# Patient Record
Sex: MALE | Race: BLACK OR AFRICAN AMERICAN
[De-identification: names, ages, dates, MRNs, and addresses within clinical notes are randomized per-mention and may not be internally consistent; named-entity substitution may affect disease eponyms.]

---

## 2017-08-10 ENCOUNTER — HOSPITAL ENCOUNTER (EMERGENCY)
Dept: HOSPITAL 23 - CED | Age: 57
LOS: 1 days | Discharge: HOME | End: 2017-08-11
Payer: COMMERCIAL

## 2017-08-10 DIAGNOSIS — E11.9: ICD-10-CM

## 2017-08-10 DIAGNOSIS — R53.1: Primary | ICD-10-CM

## 2017-08-10 DIAGNOSIS — Z79.899: ICD-10-CM

## 2017-08-10 DIAGNOSIS — Z79.2: ICD-10-CM

## 2017-08-10 DIAGNOSIS — I10: ICD-10-CM

## 2017-08-10 DIAGNOSIS — R53.83: ICD-10-CM

## 2017-08-10 LAB
BARBITURATES UR QL SCN: 0.6 MG/DL (ref 0.2–2)
BARBITURATES UR QL SCN: 4.1 G/DL (ref 3.5–5)
BASOPHIL#: 0 X10E3 (ref 0–0.3)
BASOPHIL%: 0.6 % (ref 0–2.5)
BENZODIAZ UR QL SCN: 23 U/L (ref 10–40)
BENZODIAZ UR QL SCN: 25 U/L (ref 10–42)
BLOOD UREA NITROGEN: 12 MG/DL (ref 9–23)
BUN/CREATININE RATIO: 12
BZE UR QL SCN: 56 U/L (ref 32–92)
CALCIUM SERUM: 9.6 MG/DL (ref 8.4–10.2)
CK MB SERPL-RTO: 13.7 % (ref 11–15.5)
CK MB SERPL-RTO: 31.1 G/DL (ref 30–36)
CREATININE SERUM: 1 MG/DL (ref 0.6–1.4)
DIFF IND: NO
EOSINOPHIL#: 0.1 X10E3 (ref 0–0.7)
EOSINOPHIL%: 2.2 % (ref 0–7)
GLOM FILT RATE ESTIMATED: 96.4 ML/MIN (ref 60–?)
GLUCOSE FASTING: 113 MG/DL (ref 70–110)
HEMATOCRIT: 35.3 % (ref 38–50)
HEMOGLOBIN: 11 GM/DL (ref 13–16)
KETONES UR QL: 100 MMOL/L (ref 100–111)
KETONES UR QL: 29 MMOL/L (ref 22–31)
LYMPHOCYTE#: 1.6 X10E3 (ref 1–3.5)
LYMPHOCYTE%: 33.7 % (ref 17–45)
MEAN CELL VOLUME: 79.7 FL (ref 83–96)
MEAN CORPUSCULAR HEMOGLOBIN: 24.8 PG (ref 28–34)
MEAN PLATELET VOLUME: 7.3 FL (ref 6.5–11.5)
METHADONE UR QL SCN: 1.8 NG/ML (ref 0–7.9)
MONOCYTE#: 0.3 X10E3 (ref 0–1)
MONOCYTE%: 6.5 % (ref 3–12)
NEUTROPHIL#: 2.7 X10E3 (ref 1.5–7.1)
NEUTROPHIL%: 57 % (ref 40–75)
PLATELET COUNT: 294 X10E3 (ref 140–420)
POC - TROPONIN: <0.05 NG/ML (ref ?–0.05)
POTASSIUM: 3.6 MMOL/L (ref 3.5–5.1)
PROTEIN TOTAL SERUM: 8.2 G/DL (ref 6–8.3)
RED BLOOD COUNT: 4.43 X10E (ref 3.9–5.6)
SODIUM: 138 MMOL/L (ref 135–145)
WHITE BLOOD COUNT: 4.7 X10E3 (ref 4–10.5)

## 2017-08-11 LAB
GENTAMICIN PEAK SERPL-MCNC: NO MG/L
METHADONE UR QL SCN: <1 NG/ML (ref 0–7.9)
POC - TROPONIN: <0.05 NG/ML (ref ?–0.05)
URINE APPEARANCE: CLEAR
URINE BILIRUBIN: (no result)
URINE BLOOD: (no result)
URINE COLOR: YELLOW
URINE GLUCOSE: (no result) MG/DL
URINE KETONE: (no result)
URINE LEUKOCYTE ESTERASE: (no result)
URINE NITRATE: (no result)
URINE PH: 6.5 (ref 5–8)
URINE PROTEIN: (no result)
URINE SOURCE: (no result)
URINE SPECIFIC GRAVITY: 1.02 (ref 1–1.03)
URINE UROBILINOGEN: 1 MG/DL

## 2022-06-02 ENCOUNTER — PRE-ADMISSION TESTING (OUTPATIENT)
Dept: PREADMISSION TESTING | Facility: HOSPITAL | Age: 62
End: 2022-06-02

## 2022-06-02 VITALS
WEIGHT: 299.4 LBS | RESPIRATION RATE: 18 BRPM | DIASTOLIC BLOOD PRESSURE: 80 MMHG | SYSTOLIC BLOOD PRESSURE: 130 MMHG | BODY MASS INDEX: 44.35 KG/M2 | TEMPERATURE: 98.3 F | HEIGHT: 69 IN | OXYGEN SATURATION: 99 % | HEART RATE: 70 BPM

## 2022-06-02 LAB
ANION GAP SERPL CALCULATED.3IONS-SCNC: 11.3 MMOL/L (ref 5–15)
BUN SERPL-MCNC: 9 MG/DL (ref 8–23)
BUN/CREAT SERPL: 11.1 (ref 7–25)
CALCIUM SPEC-SCNC: 9 MG/DL (ref 8.6–10.5)
CHLORIDE SERPL-SCNC: 106 MMOL/L (ref 98–107)
CO2 SERPL-SCNC: 24.7 MMOL/L (ref 22–29)
CREAT SERPL-MCNC: 0.81 MG/DL (ref 0.76–1.27)
DEPRECATED RDW RBC AUTO: 41.9 FL (ref 37–54)
EGFRCR SERPLBLD CKD-EPI 2021: 99.7 ML/MIN/1.73
ERYTHROCYTE [DISTWIDTH] IN BLOOD BY AUTOMATED COUNT: 14.4 % (ref 12.3–15.4)
GLUCOSE SERPL-MCNC: 155 MG/DL (ref 65–99)
HCT VFR BLD AUTO: 27.2 % (ref 37.5–51)
HGB BLD-MCNC: 8.1 G/DL (ref 13–17.7)
MCH RBC QN AUTO: 24.5 PG (ref 26.6–33)
MCHC RBC AUTO-ENTMCNC: 29.8 G/DL (ref 31.5–35.7)
MCV RBC AUTO: 82.4 FL (ref 79–97)
PLATELET # BLD AUTO: 307 10*3/MM3 (ref 140–450)
PMV BLD AUTO: 9.2 FL (ref 6–12)
POTASSIUM SERPL-SCNC: 3.9 MMOL/L (ref 3.5–5.2)
QT INTERVAL: 369 MS
RBC # BLD AUTO: 3.3 10*6/MM3 (ref 4.14–5.8)
SODIUM SERPL-SCNC: 142 MMOL/L (ref 136–145)
WBC NRBC COR # BLD: 4.06 10*3/MM3 (ref 3.4–10.8)

## 2022-06-02 PROCEDURE — 80048 BASIC METABOLIC PNL TOTAL CA: CPT

## 2022-06-02 PROCEDURE — 85027 COMPLETE CBC AUTOMATED: CPT

## 2022-06-02 PROCEDURE — 36415 COLL VENOUS BLD VENIPUNCTURE: CPT

## 2022-06-02 PROCEDURE — 93005 ELECTROCARDIOGRAM TRACING: CPT

## 2022-06-02 PROCEDURE — 93010 ELECTROCARDIOGRAM REPORT: CPT | Performed by: INTERNAL MEDICINE

## 2022-06-02 RX ORDER — MULTIPLE VITAMINS W/ MINERALS TAB 9MG-400MCG
1 TAB ORAL DAILY
COMMUNITY
End: 2022-06-07 | Stop reason: HOSPADM

## 2022-06-02 RX ORDER — FERROUS SULFATE 324(65)MG
1 TABLET, DELAYED RELEASE (ENTERIC COATED) ORAL DAILY
COMMUNITY

## 2022-06-02 RX ORDER — SEMAGLUTIDE 1.34 MG/ML
2 INJECTION, SOLUTION SUBCUTANEOUS WEEKLY
COMMUNITY
Start: 2022-05-23

## 2022-06-02 RX ORDER — FAMOTIDINE 20 MG/1
20 TABLET, FILM COATED ORAL 2 TIMES DAILY PRN
COMMUNITY
Start: 2022-04-22

## 2022-06-02 RX ORDER — ASPIRIN 81 MG/1
81 TABLET ORAL DAILY
COMMUNITY
End: 2022-06-07 | Stop reason: HOSPADM

## 2022-06-02 RX ORDER — HYDROCHLOROTHIAZIDE 25 MG/1
25 TABLET ORAL DAILY
COMMUNITY
Start: 2022-03-10

## 2022-06-02 RX ORDER — MELATONIN
1000 DAILY
COMMUNITY
End: 2022-06-07 | Stop reason: HOSPADM

## 2022-06-02 RX ORDER — MELOXICAM 15 MG/1
15 TABLET ORAL DAILY
COMMUNITY
End: 2022-06-07 | Stop reason: HOSPADM

## 2022-06-02 RX ORDER — LISINOPRIL 30 MG/1
30 TABLET ORAL DAILY
COMMUNITY
Start: 2022-02-25

## 2022-06-02 NOTE — DISCHARGE INSTRUCTIONS
Take the following medications the morning of surgery:  NONE    Arrive to hospital on your day of surgery at  8:00 AM.     If you are on prescription narcotic pain medication to control your pain you may also take that medication the morning of surgery.    General Instructions:  Do not eat solid food after midnight the night before surgery.  You may drink clear liquids day of surgery but must stop at least one hour before your hospital arrival time.  It is beneficial for you to have a clear drink that contains carbohydrates the day of surgery.  We suggest a 12 to 20 ounce bottle of Gatorade or Powerade for non-diabetic patients or a 12 to 20 ounce bottle of G2 or Powerade Zero for diabetic patients. (Pediatric patients, are not advised to drink a 12 to 20 ounce carbohydrate drink)    Clear liquids are liquids you can see through.  Nothing red in color.     Plain water                               Sports drinks  Sodas                                   Gelatin (Jell-O)  Fruit juices without pulp such as white grape juice and apple juice  Popsicles that contain no fruit or yogurt  Tea or coffee (no cream or milk added)  Gatorade / Powerade  G2 / Powerade Zero    Infants may have breast milk up to four hours before surgery.  Infants drinking formula may drink formula up to six hours before surgery.   Patients who avoid smoking, chewing tobacco and alcohol for 4 weeks prior to surgery have a reduced risk of post-operative complications.  Quit smoking as many days before surgery as you can.  Do not smoke, use chewing tobacco or drink alcohol the day of surgery.   If applicable bring your C-PAP/ BI-PAP machine.  Bring any papers given to you in the doctor’s office.  Wear clean comfortable clothes.  Do not wear contact lenses, false eyelashes or make-up.  Bring a case for your glasses.   Bring crutches or walker if applicable.  Remove all piercings.  Leave jewelry and any other valuables at home.  Hair extensions with  metal clips must be removed prior to surgery.  The Pre-Admission Testing nurse will instruct you to bring medications if unable to obtain an accurate list in Pre-Admission Testing.        If you were given a blood bank ID arm band remember to bring it with you the day of surgery.    Preventing a Surgical Site Infection:  For 2 to 3 days before surgery, avoid shaving with a razor because the razor can irritate skin and make it easier to develop an infection.    Any areas of open skin can increase the risk of a post-operative wound infection by allowing bacteria to enter and travel throughout the body.  Notify your surgeon if you have any skin wounds / rashes even if it is not near the expected surgical site.  The area will need assessed to determine if surgery should be delayed until it is healed.  The night prior to surgery shower using a fresh bar of anti-bacterial soap (such as Dial) and clean washcloth.  Sleep in a clean bed with clean clothing.  Do not allow pets to sleep with you.  Shower on the morning of surgery using a fresh bar of anti-bacterial soap (such as Dial) and clean washcloth.  Dry with a clean towel and dress in clean clothing.  Ask your surgeon if you will be receiving antibiotics prior to surgery.  Make sure you, your family, and all healthcare providers clean their hands with soap and water or an alcohol based hand  before caring for you or your wound.    Day of surgery:  Your arrival time is approximately two hours before your scheduled surgery time.  Upon arrival, a Pre-op nurse and Anesthesiologist will review your health history, obtain vital signs, and answer questions you may have.  The only belongings needed at this time will be a list of your home medications and if applicable your C-PAP/BI-PAP machine.  A Pre-op nurse will start an IV and you may receive medication in preparation for surgery, including something to help you relax.     Please be aware that surgery does come  with discomfort.  We want to make every effort to control your discomfort so please discuss any uncontrolled symptoms with your nurse.   Your doctor will most likely have prescribed pain medications.      If you are going home after surgery you will receive individualized written care instructions before being discharged.  A responsible adult must drive you to and from the hospital on the day of your surgery and stay with you for 24 hours.  Discharge prescriptions can be filled by the hospital pharmacy during regular pharmacy hours.  If you are having surgery late in the day/evening your prescription may be e-prescribed to your pharmacy.  Please verify your pharmacy hours or chose a 24 hour pharmacy to avoid not having access to your prescription because your pharmacy has closed for the day.    If you are staying overnight following surgery, you will be transported to your hospital room following the recovery period.  [facility] has all private rooms.    If you have any questions please call Pre-Admission Testing at [phone].  Deductibles and co-payments are collected on the day of service. Please be prepared to pay the required co-pay, deductible or deposit on the day of service as defined by your plan.    Patient Education for Self-Quarantine Process    Following your COVID testing, we strongly recommend that you wear a mask when you are with other people and practice social distancing.   Limit your activities to only required outings.  Wash your hands with soap and water frequently for at least 20 seconds.   Avoid touching your eyes, nose and mouth with unwashed hands.  Do not share anything - utensils, drinking glasses, food from the same bowl.   Sanitize household surfaces daily. Include all high touch areas (door handles, light switches, phones, countertops, etc.)    Call your surgeon immediately if you experience any of the following symptoms:  Sore Throat  Shortness of Breath or  difficulty breathing  Cough  Chills  Body soreness or muscle pain  Headache  Fever  New loss of taste or smell  Do not arrive for your surgery ill.  Your procedure will need to be rescheduled to another time.  You will need to call your physician before the day of surgery to avoid any unnecessary exposure to hospital staff as well as other patients.

## 2022-06-04 ENCOUNTER — LAB (OUTPATIENT)
Dept: LAB | Facility: HOSPITAL | Age: 62
End: 2022-06-04

## 2022-06-04 LAB — SARS-COV-2 ORF1AB RESP QL NAA+PROBE: NOT DETECTED

## 2022-06-04 PROCEDURE — U0004 COV-19 TEST NON-CDC HGH THRU: HCPCS

## 2022-06-04 PROCEDURE — C9803 HOPD COVID-19 SPEC COLLECT: HCPCS

## 2022-06-07 ENCOUNTER — ANESTHESIA EVENT (OUTPATIENT)
Dept: PERIOP | Facility: HOSPITAL | Age: 62
End: 2022-06-07

## 2022-06-07 ENCOUNTER — ANESTHESIA (OUTPATIENT)
Dept: PERIOP | Facility: HOSPITAL | Age: 62
End: 2022-06-07

## 2022-06-07 ENCOUNTER — HOSPITAL ENCOUNTER (OUTPATIENT)
Facility: HOSPITAL | Age: 62
Setting detail: HOSPITAL OUTPATIENT SURGERY
Discharge: HOME | End: 2022-06-07
Attending: OPHTHALMOLOGY | Admitting: OPHTHALMOLOGY

## 2022-06-07 VITALS
TEMPERATURE: 98.1 F | RESPIRATION RATE: 18 BRPM | DIASTOLIC BLOOD PRESSURE: 88 MMHG | OXYGEN SATURATION: 99 % | HEART RATE: 71 BPM | SYSTOLIC BLOOD PRESSURE: 166 MMHG

## 2022-06-07 LAB — GLUCOSE BLDC GLUCOMTR-MCNC: 144 MG/DL (ref 70–130)

## 2022-06-07 PROCEDURE — 25010000002 DEXAMETHASONE PER 1 MG: Performed by: NURSE ANESTHETIST, CERTIFIED REGISTERED

## 2022-06-07 PROCEDURE — 25010000002 FENTANYL CITRATE (PF) 50 MCG/ML SOLUTION: Performed by: NURSE ANESTHETIST, CERTIFIED REGISTERED

## 2022-06-07 PROCEDURE — 25010000002 PROPOFOL 10 MG/ML EMULSION: Performed by: NURSE ANESTHETIST, CERTIFIED REGISTERED

## 2022-06-07 PROCEDURE — 82962 GLUCOSE BLOOD TEST: CPT

## 2022-06-07 PROCEDURE — 25010000002 ONDANSETRON PER 1 MG: Performed by: NURSE ANESTHETIST, CERTIFIED REGISTERED

## 2022-06-07 RX ORDER — EPHEDRINE SULFATE 50 MG/ML
5 INJECTION, SOLUTION INTRAVENOUS ONCE AS NEEDED
Status: DISCONTINUED | OUTPATIENT
Start: 2022-06-07 | End: 2022-06-07 | Stop reason: HOSPADM

## 2022-06-07 RX ORDER — HYDROMORPHONE HYDROCHLORIDE 1 MG/ML
0.5 INJECTION, SOLUTION INTRAMUSCULAR; INTRAVENOUS; SUBCUTANEOUS
Status: DISCONTINUED | OUTPATIENT
Start: 2022-06-07 | End: 2022-06-07 | Stop reason: HOSPADM

## 2022-06-07 RX ORDER — FLUMAZENIL 0.1 MG/ML
0.2 INJECTION INTRAVENOUS AS NEEDED
Status: DISCONTINUED | OUTPATIENT
Start: 2022-06-07 | End: 2022-06-07 | Stop reason: HOSPADM

## 2022-06-07 RX ORDER — HYDROCODONE BITARTRATE AND ACETAMINOPHEN 5; 325 MG/1; MG/1
1 TABLET ORAL EVERY 4 HOURS PRN
Qty: 15 TABLET | Refills: 0 | Status: SHIPPED | OUTPATIENT
Start: 2022-06-07 | End: 2023-04-03

## 2022-06-07 RX ORDER — ERYTHROMYCIN 5 MG/G
OINTMENT OPHTHALMIC 2 TIMES DAILY
Qty: 3.5 G | Refills: 0 | Status: SHIPPED | OUTPATIENT
Start: 2022-06-07 | End: 2023-04-03

## 2022-06-07 RX ORDER — SODIUM CHLORIDE, SODIUM LACTATE, POTASSIUM CHLORIDE, CALCIUM CHLORIDE 600; 310; 30; 20 MG/100ML; MG/100ML; MG/100ML; MG/100ML
9 INJECTION, SOLUTION INTRAVENOUS CONTINUOUS PRN
Status: DISCONTINUED | OUTPATIENT
Start: 2022-06-07 | End: 2022-06-07 | Stop reason: HOSPADM

## 2022-06-07 RX ORDER — MAGNESIUM HYDROXIDE 1200 MG/15ML
LIQUID ORAL AS NEEDED
Status: DISCONTINUED | OUTPATIENT
Start: 2022-06-07 | End: 2022-06-07 | Stop reason: HOSPADM

## 2022-06-07 RX ORDER — SODIUM CHLORIDE 0.9 % (FLUSH) 0.9 %
10 SYRINGE (ML) INJECTION AS NEEDED
Status: DISCONTINUED | OUTPATIENT
Start: 2022-06-07 | End: 2022-06-07 | Stop reason: HOSPADM

## 2022-06-07 RX ORDER — ONDANSETRON 2 MG/ML
INJECTION INTRAMUSCULAR; INTRAVENOUS AS NEEDED
Status: DISCONTINUED | OUTPATIENT
Start: 2022-06-07 | End: 2022-06-07 | Stop reason: SURG

## 2022-06-07 RX ORDER — FENTANYL CITRATE 50 UG/ML
50 INJECTION, SOLUTION INTRAMUSCULAR; INTRAVENOUS
Status: DISCONTINUED | OUTPATIENT
Start: 2022-06-07 | End: 2022-06-07 | Stop reason: HOSPADM

## 2022-06-07 RX ORDER — SODIUM CHLORIDE 0.9 % (FLUSH) 0.9 %
10 SYRINGE (ML) INJECTION EVERY 12 HOURS SCHEDULED
Status: DISCONTINUED | OUTPATIENT
Start: 2022-06-07 | End: 2022-06-07 | Stop reason: HOSPADM

## 2022-06-07 RX ORDER — DEXAMETHASONE SODIUM PHOSPHATE 4 MG/ML
INJECTION, SOLUTION INTRA-ARTICULAR; INTRALESIONAL; INTRAMUSCULAR; INTRAVENOUS; SOFT TISSUE AS NEEDED
Status: DISCONTINUED | OUTPATIENT
Start: 2022-06-07 | End: 2022-06-07 | Stop reason: SURG

## 2022-06-07 RX ORDER — HYDROCODONE BITARTRATE AND ACETAMINOPHEN 7.5; 325 MG/1; MG/1
1 TABLET ORAL EVERY 4 HOURS PRN
Status: DISCONTINUED | OUTPATIENT
Start: 2022-06-07 | End: 2022-06-07 | Stop reason: HOSPADM

## 2022-06-07 RX ORDER — LIDOCAINE HYDROCHLORIDE 10 MG/ML
0.5 INJECTION, SOLUTION EPIDURAL; INFILTRATION; INTRACAUDAL; PERINEURAL ONCE AS NEEDED
Status: DISCONTINUED | OUTPATIENT
Start: 2022-06-07 | End: 2022-06-07 | Stop reason: HOSPADM

## 2022-06-07 RX ORDER — ONDANSETRON 2 MG/ML
4 INJECTION INTRAMUSCULAR; INTRAVENOUS ONCE AS NEEDED
Status: DISCONTINUED | OUTPATIENT
Start: 2022-06-07 | End: 2022-06-07 | Stop reason: HOSPADM

## 2022-06-07 RX ORDER — MIDAZOLAM HYDROCHLORIDE 1 MG/ML
1 INJECTION INTRAMUSCULAR; INTRAVENOUS
Status: DISCONTINUED | OUTPATIENT
Start: 2022-06-07 | End: 2022-06-07 | Stop reason: HOSPADM

## 2022-06-07 RX ORDER — FENTANYL CITRATE 50 UG/ML
INJECTION, SOLUTION INTRAMUSCULAR; INTRAVENOUS AS NEEDED
Status: DISCONTINUED | OUTPATIENT
Start: 2022-06-07 | End: 2022-06-07 | Stop reason: SURG

## 2022-06-07 RX ORDER — HYDROCODONE BITARTRATE AND ACETAMINOPHEN 5; 325 MG/1; MG/1
1 TABLET ORAL ONCE AS NEEDED
Status: DISCONTINUED | OUTPATIENT
Start: 2022-06-07 | End: 2022-06-07 | Stop reason: HOSPADM

## 2022-06-07 RX ORDER — ERYTHROMYCIN 5 MG/G
OINTMENT OPHTHALMIC AS NEEDED
Status: DISCONTINUED | OUTPATIENT
Start: 2022-06-07 | End: 2022-06-07 | Stop reason: HOSPADM

## 2022-06-07 RX ORDER — PROPOFOL 10 MG/ML
VIAL (ML) INTRAVENOUS AS NEEDED
Status: DISCONTINUED | OUTPATIENT
Start: 2022-06-07 | End: 2022-06-07 | Stop reason: SURG

## 2022-06-07 RX ORDER — LIDOCAINE HYDROCHLORIDE 20 MG/ML
INJECTION, SOLUTION INFILTRATION; PERINEURAL AS NEEDED
Status: DISCONTINUED | OUTPATIENT
Start: 2022-06-07 | End: 2022-06-07 | Stop reason: SURG

## 2022-06-07 RX ORDER — TETRACAINE HYDROCHLORIDE 5 MG/ML
SOLUTION OPHTHALMIC AS NEEDED
Status: DISCONTINUED | OUTPATIENT
Start: 2022-06-07 | End: 2022-06-07 | Stop reason: HOSPADM

## 2022-06-07 RX ADMIN — LIDOCAINE HYDROCHLORIDE 80 MG: 20 INJECTION, SOLUTION INFILTRATION; PERINEURAL at 10:46

## 2022-06-07 RX ADMIN — DEXAMETHASONE SODIUM PHOSPHATE 8 MG: 4 INJECTION, SOLUTION INTRAMUSCULAR; INTRAVENOUS at 10:51

## 2022-06-07 RX ADMIN — FENTANYL CITRATE 50 MCG: 50 INJECTION INTRAMUSCULAR; INTRAVENOUS at 10:51

## 2022-06-07 RX ADMIN — PROPOFOL 50 MG: 10 INJECTION, EMULSION INTRAVENOUS at 10:46

## 2022-06-07 RX ADMIN — HYDROCODONE BITARTRATE AND ACETAMINOPHEN 1 TABLET: 7.5; 325 TABLET ORAL at 12:41

## 2022-06-07 RX ADMIN — ONDANSETRON 4 MG: 2 INJECTION INTRAMUSCULAR; INTRAVENOUS at 10:51

## 2022-06-07 RX ADMIN — SODIUM CHLORIDE, POTASSIUM CHLORIDE, SODIUM LACTATE AND CALCIUM CHLORIDE 9 ML/HR: 600; 310; 30; 20 INJECTION, SOLUTION INTRAVENOUS at 08:57

## 2022-06-07 RX ADMIN — PROPOFOL 50 MG: 10 INJECTION, EMULSION INTRAVENOUS at 10:45

## 2022-06-07 RX ADMIN — Medication 10 ML: at 08:57

## 2022-06-07 RX ADMIN — FENTANYL CITRATE 50 MCG: 50 INJECTION INTRAMUSCULAR; INTRAVENOUS at 10:46

## 2022-06-07 NOTE — OP NOTE
OPERATIVE NOTE    Patient Identification:  Name: Sebastian Orlando  Age: 61 y.o.  Sex: male  :  1960  MRN: [id number]                                               Preoperative diagnosis: Right upper eyelid ptosis  Postoperative diagnosis: same  Procedure: Right upper eyelid ptosis repair  Surgeon: Cristian Alvarenga MD who was present and scrubbed throughout all critical portions of the operation  Assistants: Armin Hernández MD  Anesthesia: MAC  EBL: less than 50cc  Specimens:  * No orders in the log *    Description of the procedure: The patient was taken to the operating room and placed on the table in the supine position, where anesthesia was induced. 2% lidocaine with epinephrine and 0.5% marcaine in a 1:1 fashion was injected over the surgical site, and the patient was prepped and draped in the usual manner for orbitofacial surgery.     Corneal protectors were placed in both eyes.     A 15 Bard-Kvng blade incision was made through the right upper eyelid crease 8 mm from the eyelash margin, across the entire horizontal extent of the right upper eyelid. A second incision was made 10mm inferior to the junction of the brow and eyelid, and a pinch test was used to ensure the amount of skin excision was appropriate. The intervening tissue was excised with a 15 Bard-Kvng blade and Vicente scissors. The orbital septum was opened horizontally, and excess fatty tissue was excised. The levator aponeurosis was identified at its attachment to the tarsal plate and was severed from the tarsus with Vicente scissors. The cut inferior edge of the levator aponeurosis was advanced to a lower point the tarsal plate and was reattached with multiple interrupted 6-0 silk sutures, partially penetrating the tarsus to avoid corneal irritation. The sutures were adjusted until the eyelid height and contour were judged to be satisfactory. The skin was then closed with 5-0 fast absorbing suture in an interrupted and running  fashion.     The corneal protectors were removed and antibiotic ophthalmic ointment was placed over the surgical site.     The patient was then awakened and taken from the operating room in good condition, having tolerated the procedure well. There were no complications, and the estimated blood loss was less than 50 cc.

## 2022-06-07 NOTE — ANESTHESIA POSTPROCEDURE EVALUATION
Patient: Sebastian Orlando    Procedure Summary     Date: 06/07/22 Room / Location:  [facility]  [facility]    Anesthesia Start: 1040 Anesthesia Stop: 1151    Procedure: RIGHT UPPER LID PTOSIS REPAIR (Right Eye) Diagnosis:     Surgeons: Cristian Alvarenga MD Provider: Trey Spears MD    Anesthesia Type: MAC ASA Status: 3          Anesthesia Type: MAC    Vitals  Vitals Value Taken Time   /88 06/07/22 1235   Temp     Pulse 71 06/07/22 1235   Resp 18 06/07/22 1235   SpO2 99 % 06/07/22 1235           Post Anesthesia Care and Evaluation    Patient location during evaluation: bedside  Patient participation: complete - patient participated  Level of consciousness: awake  Pain management: adequate    Airway patency: patent  Anesthetic complications: No anesthetic complications  PONV Status: controlled  Cardiovascular status: acceptable  Respiratory status: acceptable  Hydration status: acceptable    Comments: --------------------            06/07/22               1235     --------------------   BP:       166/88     Pulse:      71       Resp:       18       Temp:                SpO2:      99%      --------------------

## 2022-06-07 NOTE — ANESTHESIA PREPROCEDURE EVALUATION
Anesthesia Evaluation     Patient summary reviewed and Nursing notes reviewed   NPO Solid Status: > 8 hours             Airway   Mallampati: II  TM distance: >3 FB  Neck ROM: full  no difficulty expected  Dental - normal exam     Pulmonary - negative pulmonary ROS and normal exam   Cardiovascular - normal exam    (+) hypertension,       Neuro/Psych- negative ROS  GI/Hepatic/Renal/Endo    (+) morbid obesity,  diabetes mellitus,     Musculoskeletal (-) negative ROS    Abdominal  - normal exam   Substance History - negative use     OB/GYN negative ob/gyn ROS         Other        ROS/Med Hx Other: Anemia  H/h - 8/27                  Anesthesia Plan    ASA 3     MAC     (---------------------------               06/07/22                      0820         ---------------------------   BP:          147/80         Pulse:         67           Resp:          18           Temp:   36.7 °C (98.1 °F)   SpO2:          99%         ---------------------------)  intravenous induction     Anesthetic plan, risks, benefits, and alternatives have been provided, discussed and informed consent has been obtained with: patient.    Plan discussed with CRNA.        CODE STATUS:

## 2022-06-07 NOTE — H&P
History & Physical       Patient: Sebastian Orlando    Date of Admission: 2022  7:43 AM    YOB: 1960    Medical Record Number: [id number]      Chief Complaints: Right upper eyelid ptosis      History of Present Illness: 61 y.o. male presents with right upper eyelid ptosis. No new meds/health problems since office visit      Allergies: No Known Allergies    10 point review of systems negative, except pertaining to the HPI    Medications:   Home Medications:  No current facility-administered medications on file prior to encounter.     No current outpatient medications on file prior to encounter.     Current Medications:  Scheduled Meds:sodium chloride, 10 mL, Intravenous, Q12H      Continuous Infusions:lactated ringers, 9 mL/hr, Last Rate: 9 mL/hr (22 0857)      PRN Meds:.•  lactated ringers  •  lidocaine PF 1%  •  midazolam  •  sodium chloride    Past Medical History:   Diagnosis Date   • Anemia     TAKING IRON   • Arthritis     HIPS   • Cataract    • Diabetes mellitus (HCC)    • GERD (gastroesophageal reflux disease)    • History of migraine     SEVERAL YEARS AGO   • Hypertension    • Ptosis of eyelid     RIGHT        Past Surgical History:   Procedure Laterality Date   • COLONOSCOPY     • TESTICLE UNDESCENDED REPAIR     • TOTAL HIP ARTHROPLASTY Left         Social History     Occupational History   • Not on file   Tobacco Use   • Smoking status: Former Smoker     Packs/day: 0.50     Years: 20.00     Pack years: 10.00     Types: Cigarettes     Quit date:      Years since quittin.4   • Smokeless tobacco: Never Used   Vaping Use   • Vaping Use: Never used   Substance and Sexual Activity   • Alcohol use: Yes     Comment: SOCIALLY   • Drug use: Never   • Sexual activity: Defer      Social History     Social History Narrative   • Not on file        Family History   Problem Relation Age of Onset   • Malig Hyperthermia Neg Hx            Physical Exam   Constitutional: Alert, cooperative,  in no acute distress    Head: Normocephalic.   Eyes:    Right upper eyelid ptosis  Neck: Normal range of motion.   Cardiovascular: Normal rate.    Pulmonary/Chest: Effort normal.   Neurological: Alert.   Skin: Skin is warm.   Psychiatric: Normal mood and affect.       Assessment/Plan:  The patient voiced understanding of the risks, benefits, and alternative forms of treatment that were discussed and the patient consents to proceed with right upper eyelid ptosis repair    Cristian Alvarenga MD

## 2023-04-03 ENCOUNTER — HOSPITAL ENCOUNTER (OUTPATIENT)
Dept: GENERAL RADIOLOGY | Facility: HOSPITAL | Age: 63
Discharge: HOME | End: 2023-04-03
Payer: COMMERCIAL

## 2023-04-03 ENCOUNTER — PRE-ADMISSION TESTING (OUTPATIENT)
Dept: PREADMISSION TESTING | Facility: HOSPITAL | Age: 63
End: 2023-04-03
Payer: COMMERCIAL

## 2023-04-03 VITALS
RESPIRATION RATE: 18 BRPM | DIASTOLIC BLOOD PRESSURE: 83 MMHG | OXYGEN SATURATION: 97 % | HEART RATE: 86 BPM | WEIGHT: 307 LBS | BODY MASS INDEX: 35.52 KG/M2 | TEMPERATURE: 98.2 F | SYSTOLIC BLOOD PRESSURE: 154 MMHG | HEIGHT: 78 IN

## 2023-04-03 LAB
ABO GROUP BLD: NORMAL
ALBUMIN SERPL-MCNC: 4.3 G/DL (ref 3.5–5.2)
ALBUMIN/GLOB SERPL: 1.3 G/DL
ALP SERPL-CCNC: 73 U/L (ref 39–117)
ALT SERPL W P-5'-P-CCNC: 18 U/L (ref 1–41)
ANION GAP SERPL CALCULATED.3IONS-SCNC: 9.8 MMOL/L (ref 5–15)
AST SERPL-CCNC: 18 U/L (ref 1–40)
BACTERIA UR QL AUTO: NORMAL /HPF
BILIRUB SERPL-MCNC: 0.3 MG/DL (ref 0–1.2)
BILIRUB UR QL STRIP: NEGATIVE
BLD GP AB SCN SERPL QL: NEGATIVE
BUN SERPL-MCNC: 9 MG/DL (ref 8–23)
BUN/CREAT SERPL: 11.7 (ref 7–25)
CALCIUM SPEC-SCNC: 9.9 MG/DL (ref 8.6–10.5)
CHLORIDE SERPL-SCNC: 101 MMOL/L (ref 98–107)
CLARITY UR: CLEAR
CO2 SERPL-SCNC: 27.2 MMOL/L (ref 22–29)
COLOR UR: YELLOW
CREAT SERPL-MCNC: 0.77 MG/DL (ref 0.76–1.27)
DEPRECATED RDW RBC AUTO: 36.8 FL (ref 37–54)
EGFRCR SERPLBLD CKD-EPI 2021: 100.6 ML/MIN/1.73
ERYTHROCYTE [DISTWIDTH] IN BLOOD BY AUTOMATED COUNT: 12.8 % (ref 12.3–15.4)
GLOBULIN UR ELPH-MCNC: 3.4 GM/DL
GLUCOSE SERPL-MCNC: 197 MG/DL (ref 65–99)
GLUCOSE UR STRIP-MCNC: NEGATIVE MG/DL
HCT VFR BLD AUTO: 35.3 % (ref 37.5–51)
HGB BLD-MCNC: 11.1 G/DL (ref 13–17.7)
HGB UR QL STRIP.AUTO: NEGATIVE
HYALINE CASTS UR QL AUTO: NORMAL /LPF
INR PPP: 0.91 (ref 0.9–1.1)
KETONES UR QL STRIP: NEGATIVE
LEUKOCYTE ESTERASE UR QL STRIP.AUTO: NEGATIVE
MCH RBC QN AUTO: 24.7 PG (ref 26.6–33)
MCHC RBC AUTO-ENTMCNC: 31.4 G/DL (ref 31.5–35.7)
MCV RBC AUTO: 78.6 FL (ref 79–97)
NITRITE UR QL STRIP: NEGATIVE
PH UR STRIP.AUTO: 5.5 [PH] (ref 5–8)
PLATELET # BLD AUTO: 311 10*3/MM3 (ref 140–450)
PMV BLD AUTO: 9.4 FL (ref 6–12)
POTASSIUM SERPL-SCNC: 4 MMOL/L (ref 3.5–5.2)
PROT SERPL-MCNC: 7.7 G/DL (ref 6–8.5)
PROT UR QL STRIP: NEGATIVE
PROTHROMBIN TIME: 12.4 SECONDS (ref 11.7–14.2)
RBC # BLD AUTO: 4.49 10*6/MM3 (ref 4.14–5.8)
RBC # UR STRIP: NORMAL /HPF
REF LAB TEST METHOD: NORMAL
RH BLD: POSITIVE
SODIUM SERPL-SCNC: 138 MMOL/L (ref 136–145)
SP GR UR STRIP: 1.01 (ref 1–1.03)
SQUAMOUS #/AREA URNS HPF: NORMAL /HPF
T&S EXPIRATION DATE: NORMAL
UROBILINOGEN UR QL STRIP: NORMAL
WBC # UR STRIP: NORMAL /HPF
WBC NRBC COR # BLD: 3.97 10*3/MM3 (ref 3.4–10.8)

## 2023-04-03 PROCEDURE — 86850 RBC ANTIBODY SCREEN: CPT

## 2023-04-03 PROCEDURE — 85027 COMPLETE CBC AUTOMATED: CPT

## 2023-04-03 PROCEDURE — 71046 X-RAY EXAM CHEST 2 VIEWS: CPT

## 2023-04-03 PROCEDURE — 81001 URINALYSIS AUTO W/SCOPE: CPT

## 2023-04-03 PROCEDURE — 36415 COLL VENOUS BLD VENIPUNCTURE: CPT

## 2023-04-03 PROCEDURE — 86900 BLOOD TYPING SEROLOGIC ABO: CPT

## 2023-04-03 PROCEDURE — 73502 X-RAY EXAM HIP UNI 2-3 VIEWS: CPT

## 2023-04-03 PROCEDURE — 85610 PROTHROMBIN TIME: CPT

## 2023-04-03 PROCEDURE — 86901 BLOOD TYPING SEROLOGIC RH(D): CPT

## 2023-04-03 PROCEDURE — 93010 ELECTROCARDIOGRAM REPORT: CPT | Performed by: INTERNAL MEDICINE

## 2023-04-03 PROCEDURE — 80053 COMPREHEN METABOLIC PANEL: CPT

## 2023-04-03 PROCEDURE — 93005 ELECTROCARDIOGRAM TRACING: CPT

## 2023-04-03 RX ORDER — CHLORHEXIDINE GLUCONATE 500 MG/1
CLOTH TOPICAL
COMMUNITY
End: 2023-04-14 | Stop reason: HOSPADM

## 2023-04-03 RX ORDER — MELOXICAM 15 MG/1
15 TABLET ORAL DAILY
COMMUNITY
Start: 2023-02-07 | End: 2023-04-14 | Stop reason: HOSPADM

## 2023-04-03 NOTE — DISCHARGE INSTRUCTIONS
ARRIVE DAY OF SURGERY AS INFORMED BY DR VERGARA'S OFFICE        Take the following medications the morning of surgery:  NONE      If you are on prescription narcotic pain medication to control your pain you may also take that medication the morning of surgery.    General Instructions:  Do not eat solid food after midnight the night before surgery.  You may drink clear liquids day of surgery but must stop at least one hour before your hospital arrival time.  It is beneficial for you to have a clear drink that contains carbohydrates the day of surgery.  We suggest a 12 to 20 ounce bottle of Gatorade or Powerade for non-diabetic patients or a 12 to 20 ounce bottle of G2 or Powerade Zero for diabetic patients. (Pediatric patients, are not advised to drink a 12 to 20 ounce carbohydrate drink)    Clear liquids are liquids you can see through.  Nothing red in color.     Plain water                               Sports drinks  Sodas                                   Gelatin (Jell-O)  Fruit juices without pulp such as white grape juice and apple juice  Popsicles that contain no fruit or yogurt  Tea or coffee (no cream or milk added)  Gatorade / Powerade  G2 / Powerade Zero    Infants may have breast milk up to four hours before surgery.  Infants drinking formula may drink formula up to six hours before surgery.   Patients who avoid smoking, chewing tobacco and alcohol for 4 weeks prior to surgery have a reduced risk of post-operative complications.  Quit smoking as many days before surgery as you can.  Do not smoke, use chewing tobacco or drink alcohol the day of surgery.   If applicable bring your C-PAP/ BI-PAP machine.  Bring any papers given to you in the doctor’s office.  Wear clean comfortable clothes.  Do not wear contact lenses, false eyelashes or make-up.  Bring a case for your glasses.   Bring crutches or walker if applicable.  Remove all piercings.  Leave jewelry and any other valuables at home.  Hair extensions  with metal clips must be removed prior to surgery.  The Pre-Admission Testing nurse will instruct you to bring medications if unable to obtain an accurate list in Pre-Admission Testing.        If you were given a blood bank ID arm band remember to bring it with you the day of surgery.    Preventing a Surgical Site Infection:  For 2 to 3 days before surgery, avoid shaving with a razor because the razor can irritate skin and make it easier to develop an infection.    Any areas of open skin can increase the risk of a post-operative wound infection by allowing bacteria to enter and travel throughout the body.  Notify your surgeon if you have any skin wounds / rashes even if it is not near the expected surgical site.  The area will need assessed to determine if surgery should be delayed until it is healed.  The night prior to surgery shower using a fresh bar of anti-bacterial soap (such as Dial) and clean washcloth.  Sleep in a clean bed with clean clothing.  Do not allow pets to sleep with you.  Shower on the morning of surgery using a fresh bar of anti-bacterial soap (such as Dial) and clean washcloth.  Dry with a clean towel and dress in clean clothing.  Ask your surgeon if you will be receiving antibiotics prior to surgery.  Make sure you, your family, and all healthcare providers clean their hands with soap and water or an alcohol based hand  before caring for you or your wound.    Day of surgery:  Your arrival time is approximately two hours before your scheduled surgery time.  Upon arrival, a Pre-op nurse and Anesthesiologist will review your health history, obtain vital signs, and answer questions you may have.  The only belongings needed at this time will be a list of your home medications and if applicable your C-PAP/BI-PAP machine.  A Pre-op nurse will start an IV and you may receive medication in preparation for surgery, including something to help you relax.     Please be aware that surgery does  come with discomfort.  We want to make every effort to control your discomfort so please discuss any uncontrolled symptoms with your nurse.   Your doctor will most likely have prescribed pain medications.      If you are going home after surgery you will receive individualized written care instructions before being discharged.  A responsible adult must drive you to and from the hospital on the day of your surgery and stay with you for 24 hours.  Discharge prescriptions can be filled by the hospital pharmacy during regular pharmacy hours.  If you are having surgery late in the day/evening your prescription may be e-prescribed to your pharmacy.  Please verify your pharmacy hours or chose a 24 hour pharmacy to avoid not having access to your prescription because your pharmacy has closed for the day.    If you are staying overnight following surgery, you will be transported to your hospital room following the recovery period.  [facility] has all private rooms.    If you have any questions please call Pre-Admission Testing at [phone].  Deductibles and co-payments are collected on the day of service. Please be prepared to pay the required co-pay, deductible or deposit on the day of service as defined by your plan.    Call your surgeon immediately if you experience any of the following symptoms:  Sore Throat  Shortness of Breath or difficulty breathing  Cough  Chills  Body soreness or muscle pain  Headache  Fever  New loss of taste or smell  Do not arrive for your surgery ill.  Your procedure will need to be rescheduled to another time.  You will need to call your physician before the day of surgery to avoid any unnecessary exposure to hospital staff as well as other patients.       CHLORHEXIDINE CLOTH INSTRUCTIONS  The morning of surgery follow these instructions using the Chlorhexidine cloths you've been given.  These steps reduce bacteria on the body.  Do not use the cloths near your eyes, ears  mouth, genitalia or on open wounds.  Throw the cloths away after use but do not try to flush them down a toilet.      Open and remove one cloth at a time from the package.    Leave the cloth unfolded and begin the bathing.  Massage the skin with the cloths using gentle pressure to remove bacteria.  Do not scrub harshly.   Follow the steps below with one 2% CHG cloth per area (6 total cloths).  One cloth for neck, shoulders and chest.  One cloth for both arms, hands, fingers and underarms (do underarms last).  One cloth for the abdomen followed by groin.  One cloth for right leg and foot including between the toes.  One cloth for left leg and foot including between the toes.  The last cloth is to be used for the back of the neck, back and buttocks.    Allow the CHG to air dry 3 minutes on the skin which will give it time to work and decrease the chance of irritation.  The skin may feel sticky until it is dry.  Do not rinse with water or any other liquid or you will lose the beneficial effects of the CHG.  If mild skin irritation occurs, do rinse the skin to remove the CHG.  Report this to the nurse at time of admission.  Do not apply lotions, creams, ointments, deodorants or perfumes after using the clothes. Dress in clean clothes before coming to the hospital.

## 2023-04-04 LAB — QT INTERVAL: 362 MS

## 2023-04-13 ENCOUNTER — ANESTHESIA (OUTPATIENT)
Dept: PERIOP | Facility: HOSPITAL | Age: 63
End: 2023-04-13
Payer: COMMERCIAL

## 2023-04-13 ENCOUNTER — HOSPITAL ENCOUNTER (OUTPATIENT)
Facility: HOSPITAL | Age: 63
Discharge: HOME | End: 2023-04-14
Attending: ORTHOPAEDIC SURGERY | Admitting: ORTHOPAEDIC SURGERY
Payer: COMMERCIAL

## 2023-04-13 ENCOUNTER — APPOINTMENT (OUTPATIENT)
Dept: GENERAL RADIOLOGY | Facility: HOSPITAL | Age: 63
End: 2023-04-13
Payer: COMMERCIAL

## 2023-04-13 ENCOUNTER — ANESTHESIA EVENT (OUTPATIENT)
Dept: PERIOP | Facility: HOSPITAL | Age: 63
End: 2023-04-13
Payer: COMMERCIAL

## 2023-04-13 DIAGNOSIS — M16.11 PRIMARY OSTEOARTHRITIS OF RIGHT HIP: Primary | ICD-10-CM

## 2023-04-13 LAB
GLUCOSE BLDC GLUCOMTR-MCNC: 160 MG/DL (ref 70–130)
GLUCOSE BLDC GLUCOMTR-MCNC: 223 MG/DL (ref 70–130)
GLUCOSE BLDC GLUCOMTR-MCNC: 295 MG/DL (ref 70–130)
GLUCOSE BLDC GLUCOMTR-MCNC: 323 MG/DL (ref 70–130)

## 2023-04-13 PROCEDURE — 63710000001 INSULIN LISPRO (HUMAN) PER 5 UNITS: Performed by: PHYSICIAN ASSISTANT

## 2023-04-13 PROCEDURE — 25010000002 PROPOFOL 10 MG/ML EMULSION: Performed by: NURSE ANESTHETIST, CERTIFIED REGISTERED

## 2023-04-13 PROCEDURE — G0378 HOSPITAL OBSERVATION PER HR: HCPCS

## 2023-04-13 PROCEDURE — 25010000002 FENTANYL CITRATE (PF) 50 MCG/ML SOLUTION: Performed by: NURSE ANESTHETIST, CERTIFIED REGISTERED

## 2023-04-13 PROCEDURE — 25010000002 HYDROMORPHONE 1 MG/ML SOLUTION: Performed by: NURSE ANESTHETIST, CERTIFIED REGISTERED

## 2023-04-13 PROCEDURE — C1776 JOINT DEVICE (IMPLANTABLE): HCPCS | Performed by: ORTHOPAEDIC SURGERY

## 2023-04-13 PROCEDURE — C1713 ANCHOR/SCREW BN/BN,TIS/BN: HCPCS | Performed by: ORTHOPAEDIC SURGERY

## 2023-04-13 PROCEDURE — 25010000002 MORPHINE PER 10 MG: Performed by: ORTHOPAEDIC SURGERY

## 2023-04-13 PROCEDURE — 97161 PT EVAL LOW COMPLEX 20 MIN: CPT

## 2023-04-13 PROCEDURE — 25010000002 ONDANSETRON PER 1 MG: Performed by: NURSE ANESTHETIST, CERTIFIED REGISTERED

## 2023-04-13 PROCEDURE — 73501 X-RAY EXAM HIP UNI 1 VIEW: CPT

## 2023-04-13 PROCEDURE — 25010000002 DEXAMETHASONE SODIUM PHOSPHATE 20 MG/5ML SOLUTION: Performed by: STUDENT IN AN ORGANIZED HEALTH CARE EDUCATION/TRAINING PROGRAM

## 2023-04-13 PROCEDURE — 25010000002 FENTANYL CITRATE (PF) 50 MCG/ML SOLUTION: Performed by: STUDENT IN AN ORGANIZED HEALTH CARE EDUCATION/TRAINING PROGRAM

## 2023-04-13 PROCEDURE — 25010000002 HYDROMORPHONE PER 4 MG: Performed by: NURSE ANESTHETIST, CERTIFIED REGISTERED

## 2023-04-13 PROCEDURE — 25010000002 KETOROLAC TROMETHAMINE PER 15 MG: Performed by: ORTHOPAEDIC SURGERY

## 2023-04-13 PROCEDURE — 25010000002 EPINEPHRINE 1 MG/ML SOLUTION 30 ML VIAL: Performed by: ORTHOPAEDIC SURGERY

## 2023-04-13 PROCEDURE — 82962 GLUCOSE BLOOD TEST: CPT

## 2023-04-13 PROCEDURE — 25010000002 ROPIVACAINE PER 1 MG: Performed by: STUDENT IN AN ORGANIZED HEALTH CARE EDUCATION/TRAINING PROGRAM

## 2023-04-13 PROCEDURE — 25010000002 CEFAZOLIN IN DEXTROSE 2-4 GM/100ML-% SOLUTION: Performed by: NURSE ANESTHETIST, CERTIFIED REGISTERED

## 2023-04-13 PROCEDURE — 25010000002 ROPIVACAINE PER 1 MG: Performed by: ORTHOPAEDIC SURGERY

## 2023-04-13 PROCEDURE — 25010000002 MIDAZOLAM PER 1 MG: Performed by: STUDENT IN AN ORGANIZED HEALTH CARE EDUCATION/TRAINING PROGRAM

## 2023-04-13 PROCEDURE — 97110 THERAPEUTIC EXERCISES: CPT

## 2023-04-13 PROCEDURE — 25010000002 CEFAZOLIN PER 500 MG: Performed by: ORTHOPAEDIC SURGERY

## 2023-04-13 DEVICE — IMPLANTABLE DEVICE
Type: IMPLANTABLE DEVICE | Site: HIP | Status: FUNCTIONAL
Brand: BIOLOX® OPTION

## 2023-04-13 DEVICE — IMPLANTABLE DEVICE
Type: IMPLANTABLE DEVICE | Site: HIP | Status: FUNCTIONAL
Brand: G7® VIVACIT-E®

## 2023-04-13 DEVICE — TOTAL HIP PRIMARY: Type: IMPLANTABLE DEVICE | Status: FUNCTIONAL

## 2023-04-13 DEVICE — IMPLANTABLE DEVICE
Type: IMPLANTABLE DEVICE | Site: HIP | Status: FUNCTIONAL
Brand: G7® ACETABULAR SYSTEM

## 2023-04-13 DEVICE — CP HIP UPCHRG OSSEOTI LTD HL CUPS: Type: IMPLANTABLE DEVICE | Status: FUNCTIONAL

## 2023-04-13 DEVICE — BONE WAX
Type: IMPLANTABLE DEVICE | Site: HIP | Status: FUNCTIONAL
Brand: ETHICON

## 2023-04-13 DEVICE — STEM FEM/HIP TAPERLOC COMPL DIST/REDUC PPS OFFST/STD SZ15: Type: IMPLANTABLE DEVICE | Site: HIP | Status: FUNCTIONAL

## 2023-04-13 DEVICE — SCRW ACET CORT TRILOGY S/TAP 6.5X35: Type: IMPLANTABLE DEVICE | Site: HIP | Status: FUNCTIONAL

## 2023-04-13 DEVICE — IMPLANTABLE DEVICE
Type: IMPLANTABLE DEVICE | Site: HIP | Status: FUNCTIONAL
Brand: BIOLOX® DELTA OPTION

## 2023-04-13 DEVICE — SCRW ACET CORT TRILOGY S/TAP 6.5X30: Type: IMPLANTABLE DEVICE | Site: HIP | Status: FUNCTIONAL

## 2023-04-13 RX ORDER — NALOXONE HCL 0.4 MG/ML
0.4 VIAL (ML) INJECTION
Status: DISCONTINUED | OUTPATIENT
Start: 2023-04-13 | End: 2023-04-14 | Stop reason: HOSPADM

## 2023-04-13 RX ORDER — HYDROCHLOROTHIAZIDE 25 MG/1
25 TABLET ORAL DAILY
Status: DISCONTINUED | OUTPATIENT
Start: 2023-04-13 | End: 2023-04-14 | Stop reason: HOSPADM

## 2023-04-13 RX ORDER — FENTANYL CITRATE 50 UG/ML
50 INJECTION, SOLUTION INTRAMUSCULAR; INTRAVENOUS
Status: DISCONTINUED | OUTPATIENT
Start: 2023-04-13 | End: 2023-04-13 | Stop reason: HOSPADM

## 2023-04-13 RX ORDER — CEFAZOLIN SODIUM 2 G/100ML
INJECTION, SOLUTION INTRAVENOUS AS NEEDED
Status: DISCONTINUED | OUTPATIENT
Start: 2023-04-13 | End: 2023-04-13 | Stop reason: SURG

## 2023-04-13 RX ORDER — HYDRALAZINE HYDROCHLORIDE 20 MG/ML
5 INJECTION INTRAMUSCULAR; INTRAVENOUS
Status: DISCONTINUED | OUTPATIENT
Start: 2023-04-13 | End: 2023-04-13 | Stop reason: HOSPADM

## 2023-04-13 RX ORDER — PROPOFOL 10 MG/ML
VIAL (ML) INTRAVENOUS AS NEEDED
Status: DISCONTINUED | OUTPATIENT
Start: 2023-04-13 | End: 2023-04-13 | Stop reason: SURG

## 2023-04-13 RX ORDER — IPRATROPIUM BROMIDE AND ALBUTEROL SULFATE 2.5; .5 MG/3ML; MG/3ML
3 SOLUTION RESPIRATORY (INHALATION) ONCE AS NEEDED
Status: DISCONTINUED | OUTPATIENT
Start: 2023-04-13 | End: 2023-04-13 | Stop reason: HOSPADM

## 2023-04-13 RX ORDER — ONDANSETRON 2 MG/ML
4 INJECTION INTRAMUSCULAR; INTRAVENOUS ONCE AS NEEDED
Status: DISCONTINUED | OUTPATIENT
Start: 2023-04-13 | End: 2023-04-13 | Stop reason: HOSPADM

## 2023-04-13 RX ORDER — CELECOXIB 200 MG/1
200 CAPSULE ORAL ONCE
Status: COMPLETED | OUTPATIENT
Start: 2023-04-13 | End: 2023-04-13

## 2023-04-13 RX ORDER — ROCURONIUM BROMIDE 10 MG/ML
INJECTION, SOLUTION INTRAVENOUS AS NEEDED
Status: DISCONTINUED | OUTPATIENT
Start: 2023-04-13 | End: 2023-04-13 | Stop reason: SURG

## 2023-04-13 RX ORDER — SODIUM CHLORIDE 9 MG/ML
40 INJECTION, SOLUTION INTRAVENOUS AS NEEDED
Status: DISCONTINUED | OUTPATIENT
Start: 2023-04-13 | End: 2023-04-14 | Stop reason: HOSPADM

## 2023-04-13 RX ORDER — SODIUM CHLORIDE 0.9 % (FLUSH) 0.9 %
3 SYRINGE (ML) INJECTION EVERY 12 HOURS SCHEDULED
Status: DISCONTINUED | OUTPATIENT
Start: 2023-04-13 | End: 2023-04-13 | Stop reason: HOSPADM

## 2023-04-13 RX ORDER — LIDOCAINE HYDROCHLORIDE 20 MG/ML
INJECTION, SOLUTION EPIDURAL; INFILTRATION; INTRACAUDAL; PERINEURAL AS NEEDED
Status: DISCONTINUED | OUTPATIENT
Start: 2023-04-13 | End: 2023-04-13 | Stop reason: SURG

## 2023-04-13 RX ORDER — FERROUS SULFATE 325(65) MG
325 TABLET ORAL
Status: DISCONTINUED | OUTPATIENT
Start: 2023-04-14 | End: 2023-04-14 | Stop reason: HOSPADM

## 2023-04-13 RX ORDER — DROPERIDOL 2.5 MG/ML
0.62 INJECTION, SOLUTION INTRAMUSCULAR; INTRAVENOUS
Status: DISCONTINUED | OUTPATIENT
Start: 2023-04-13 | End: 2023-04-13 | Stop reason: HOSPADM

## 2023-04-13 RX ORDER — LIDOCAINE HYDROCHLORIDE 10 MG/ML
0.5 INJECTION, SOLUTION EPIDURAL; INFILTRATION; INTRACAUDAL; PERINEURAL ONCE AS NEEDED
Status: DISCONTINUED | OUTPATIENT
Start: 2023-04-13 | End: 2023-04-13 | Stop reason: HOSPADM

## 2023-04-13 RX ORDER — NICOTINE POLACRILEX 4 MG
15 LOZENGE BUCCAL
Status: DISCONTINUED | OUTPATIENT
Start: 2023-04-13 | End: 2023-04-14 | Stop reason: HOSPADM

## 2023-04-13 RX ORDER — DEXTROSE MONOHYDRATE 25 G/50ML
25 INJECTION, SOLUTION INTRAVENOUS
Status: DISCONTINUED | OUTPATIENT
Start: 2023-04-13 | End: 2023-04-14 | Stop reason: HOSPADM

## 2023-04-13 RX ORDER — ASPIRIN 325 MG
325 TABLET, DELAYED RELEASE (ENTERIC COATED) ORAL 2 TIMES DAILY WITH MEALS
Qty: 60 TABLET | Refills: 0 | Status: SHIPPED | OUTPATIENT
Start: 2023-04-14

## 2023-04-13 RX ORDER — ASPIRIN 325 MG
325 TABLET, DELAYED RELEASE (ENTERIC COATED) ORAL 2 TIMES DAILY WITH MEALS
Status: DISCONTINUED | OUTPATIENT
Start: 2023-04-14 | End: 2023-04-14 | Stop reason: HOSPADM

## 2023-04-13 RX ORDER — KETOROLAC TROMETHAMINE 30 MG/ML
30 INJECTION, SOLUTION INTRAMUSCULAR; INTRAVENOUS EVERY 6 HOURS PRN
Status: DISCONTINUED | OUTPATIENT
Start: 2023-04-13 | End: 2023-04-14 | Stop reason: HOSPADM

## 2023-04-13 RX ORDER — DOCUSATE SODIUM 250 MG
250 CAPSULE ORAL 2 TIMES DAILY PRN
Qty: 30 CAPSULE | Refills: 1 | Status: SHIPPED | OUTPATIENT
Start: 2023-04-13

## 2023-04-13 RX ORDER — SODIUM CHLORIDE 450 MG/100ML
100 INJECTION, SOLUTION INTRAVENOUS CONTINUOUS
Status: DISCONTINUED | OUTPATIENT
Start: 2023-04-13 | End: 2023-04-14 | Stop reason: HOSPADM

## 2023-04-13 RX ORDER — OXYCODONE HYDROCHLORIDE AND ACETAMINOPHEN 5; 325 MG/1; MG/1
1-2 TABLET ORAL EVERY 4 HOURS PRN
Qty: 42 TABLET | Refills: 0 | Status: SHIPPED | OUTPATIENT
Start: 2023-04-13

## 2023-04-13 RX ORDER — SODIUM CHLORIDE 0.9 % (FLUSH) 0.9 %
3-10 SYRINGE (ML) INJECTION AS NEEDED
Status: DISCONTINUED | OUTPATIENT
Start: 2023-04-13 | End: 2023-04-13 | Stop reason: HOSPADM

## 2023-04-13 RX ORDER — SODIUM CHLORIDE 0.9 % (FLUSH) 0.9 %
1-10 SYRINGE (ML) INJECTION AS NEEDED
Status: DISCONTINUED | OUTPATIENT
Start: 2023-04-13 | End: 2023-04-14 | Stop reason: HOSPADM

## 2023-04-13 RX ORDER — OXYCODONE HYDROCHLORIDE AND ACETAMINOPHEN 5; 325 MG/1; MG/1
1 TABLET ORAL EVERY 4 HOURS PRN
Status: DISCONTINUED | OUTPATIENT
Start: 2023-04-13 | End: 2023-04-14 | Stop reason: HOSPADM

## 2023-04-13 RX ORDER — ROPIVACAINE HYDROCHLORIDE 2 MG/ML
INJECTION, SOLUTION EPIDURAL; INFILTRATION; PERINEURAL
Status: COMPLETED | OUTPATIENT
Start: 2023-04-13 | End: 2023-04-13

## 2023-04-13 RX ORDER — FLUMAZENIL 0.1 MG/ML
0.2 INJECTION INTRAVENOUS AS NEEDED
Status: DISCONTINUED | OUTPATIENT
Start: 2023-04-13 | End: 2023-04-13 | Stop reason: HOSPADM

## 2023-04-13 RX ORDER — DIAZEPAM 5 MG/1
5 TABLET ORAL 2 TIMES DAILY PRN
Status: DISCONTINUED | OUTPATIENT
Start: 2023-04-13 | End: 2023-04-14 | Stop reason: HOSPADM

## 2023-04-13 RX ORDER — DOCUSATE SODIUM 100 MG/1
100 CAPSULE, LIQUID FILLED ORAL 2 TIMES DAILY PRN
Status: DISCONTINUED | OUTPATIENT
Start: 2023-04-13 | End: 2023-04-14 | Stop reason: HOSPADM

## 2023-04-13 RX ORDER — ONDANSETRON 2 MG/ML
4 INJECTION INTRAMUSCULAR; INTRAVENOUS EVERY 6 HOURS PRN
Status: DISCONTINUED | OUTPATIENT
Start: 2023-04-13 | End: 2023-04-14 | Stop reason: HOSPADM

## 2023-04-13 RX ORDER — ONDANSETRON 4 MG/1
4 TABLET, FILM COATED ORAL EVERY 6 HOURS PRN
Status: DISCONTINUED | OUTPATIENT
Start: 2023-04-13 | End: 2023-04-14 | Stop reason: HOSPADM

## 2023-04-13 RX ORDER — OXYCODONE HYDROCHLORIDE AND ACETAMINOPHEN 5; 325 MG/1; MG/1
2 TABLET ORAL EVERY 4 HOURS PRN
Status: DISCONTINUED | OUTPATIENT
Start: 2023-04-13 | End: 2023-04-14 | Stop reason: HOSPADM

## 2023-04-13 RX ORDER — PROMETHAZINE HYDROCHLORIDE 25 MG/1
25 SUPPOSITORY RECTAL ONCE AS NEEDED
Status: DISCONTINUED | OUTPATIENT
Start: 2023-04-13 | End: 2023-04-13 | Stop reason: HOSPADM

## 2023-04-13 RX ORDER — PROMETHAZINE HYDROCHLORIDE 12.5 MG/1
12.5 TABLET ORAL EVERY 6 HOURS PRN
Status: DISCONTINUED | OUTPATIENT
Start: 2023-04-13 | End: 2023-04-14 | Stop reason: HOSPADM

## 2023-04-13 RX ORDER — SODIUM CHLORIDE 0.9 % (FLUSH) 0.9 %
10 SYRINGE (ML) INJECTION EVERY 12 HOURS SCHEDULED
Status: DISCONTINUED | OUTPATIENT
Start: 2023-04-13 | End: 2023-04-14 | Stop reason: HOSPADM

## 2023-04-13 RX ORDER — OXYCODONE AND ACETAMINOPHEN 7.5; 325 MG/1; MG/1
1 TABLET ORAL EVERY 4 HOURS PRN
Status: DISCONTINUED | OUTPATIENT
Start: 2023-04-13 | End: 2023-04-13 | Stop reason: HOSPADM

## 2023-04-13 RX ORDER — HYDROMORPHONE HYDROCHLORIDE 1 MG/ML
0.5 INJECTION, SOLUTION INTRAMUSCULAR; INTRAVENOUS; SUBCUTANEOUS
Status: DISCONTINUED | OUTPATIENT
Start: 2023-04-13 | End: 2023-04-13 | Stop reason: HOSPADM

## 2023-04-13 RX ORDER — CEFAZOLIN SODIUM IN 0.9 % NACL 3 G/100 ML
3 INTRAVENOUS SOLUTION, PIGGYBACK (ML) INTRAVENOUS ONCE
Status: COMPLETED | OUTPATIENT
Start: 2023-04-13 | End: 2023-04-13

## 2023-04-13 RX ORDER — HYDROCODONE BITARTRATE AND ACETAMINOPHEN 7.5; 325 MG/1; MG/1
1 TABLET ORAL ONCE AS NEEDED
Status: DISCONTINUED | OUTPATIENT
Start: 2023-04-13 | End: 2023-04-13 | Stop reason: HOSPADM

## 2023-04-13 RX ORDER — ONDANSETRON 2 MG/ML
INJECTION INTRAMUSCULAR; INTRAVENOUS AS NEEDED
Status: DISCONTINUED | OUTPATIENT
Start: 2023-04-13 | End: 2023-04-13 | Stop reason: SURG

## 2023-04-13 RX ORDER — LABETALOL HYDROCHLORIDE 5 MG/ML
5 INJECTION, SOLUTION INTRAVENOUS
Status: DISCONTINUED | OUTPATIENT
Start: 2023-04-13 | End: 2023-04-13 | Stop reason: HOSPADM

## 2023-04-13 RX ORDER — DEXAMETHASONE SODIUM PHOSPHATE 4 MG/ML
INJECTION, SOLUTION INTRA-ARTICULAR; INTRALESIONAL; INTRAMUSCULAR; INTRAVENOUS; SOFT TISSUE
Status: COMPLETED | OUTPATIENT
Start: 2023-04-13 | End: 2023-04-13

## 2023-04-13 RX ORDER — SODIUM CHLORIDE, SODIUM LACTATE, POTASSIUM CHLORIDE, CALCIUM CHLORIDE 600; 310; 30; 20 MG/100ML; MG/100ML; MG/100ML; MG/100ML
9 INJECTION, SOLUTION INTRAVENOUS CONTINUOUS
Status: DISCONTINUED | OUTPATIENT
Start: 2023-04-13 | End: 2023-04-14 | Stop reason: HOSPADM

## 2023-04-13 RX ORDER — ACETAMINOPHEN 325 MG/1
325 TABLET ORAL EVERY 4 HOURS PRN
Status: DISCONTINUED | OUTPATIENT
Start: 2023-04-13 | End: 2023-04-14 | Stop reason: HOSPADM

## 2023-04-13 RX ORDER — FAMOTIDINE 20 MG/1
20 TABLET, FILM COATED ORAL 2 TIMES DAILY PRN
Status: DISCONTINUED | OUTPATIENT
Start: 2023-04-13 | End: 2023-04-14 | Stop reason: HOSPADM

## 2023-04-13 RX ORDER — INSULIN LISPRO 100 [IU]/ML
0-7 INJECTION, SOLUTION INTRAVENOUS; SUBCUTANEOUS
Status: DISCONTINUED | OUTPATIENT
Start: 2023-04-13 | End: 2023-04-14 | Stop reason: HOSPADM

## 2023-04-13 RX ORDER — DIPHENHYDRAMINE HYDROCHLORIDE 50 MG/ML
12.5 INJECTION INTRAMUSCULAR; INTRAVENOUS
Status: DISCONTINUED | OUTPATIENT
Start: 2023-04-13 | End: 2023-04-13 | Stop reason: HOSPADM

## 2023-04-13 RX ORDER — MAGNESIUM HYDROXIDE 1200 MG/15ML
LIQUID ORAL AS NEEDED
Status: DISCONTINUED | OUTPATIENT
Start: 2023-04-13 | End: 2023-04-13 | Stop reason: HOSPADM

## 2023-04-13 RX ORDER — TRANEXAMIC ACID 100 MG/ML
INJECTION, SOLUTION INTRAVENOUS AS NEEDED
Status: DISCONTINUED | OUTPATIENT
Start: 2023-04-13 | End: 2023-04-13 | Stop reason: SURG

## 2023-04-13 RX ORDER — MIDAZOLAM HYDROCHLORIDE 1 MG/ML
1 INJECTION INTRAMUSCULAR; INTRAVENOUS
Status: DISCONTINUED | OUTPATIENT
Start: 2023-04-13 | End: 2023-04-13 | Stop reason: HOSPADM

## 2023-04-13 RX ORDER — MORPHINE SULFATE 4 MG/ML
4 INJECTION, SOLUTION INTRAMUSCULAR; INTRAVENOUS
Status: DISCONTINUED | OUTPATIENT
Start: 2023-04-13 | End: 2023-04-14 | Stop reason: HOSPADM

## 2023-04-13 RX ORDER — EPHEDRINE SULFATE 50 MG/ML
5 INJECTION, SOLUTION INTRAVENOUS ONCE AS NEEDED
Status: DISCONTINUED | OUTPATIENT
Start: 2023-04-13 | End: 2023-04-13 | Stop reason: HOSPADM

## 2023-04-13 RX ORDER — PROMETHAZINE HYDROCHLORIDE 25 MG/1
25 TABLET ORAL ONCE AS NEEDED
Status: DISCONTINUED | OUTPATIENT
Start: 2023-04-13 | End: 2023-04-13 | Stop reason: HOSPADM

## 2023-04-13 RX ORDER — DIPHENHYDRAMINE HYDROCHLORIDE 50 MG/ML
12.5 INJECTION INTRAMUSCULAR; INTRAVENOUS EVERY 6 HOURS PRN
Status: DISCONTINUED | OUTPATIENT
Start: 2023-04-13 | End: 2023-04-14 | Stop reason: HOSPADM

## 2023-04-13 RX ORDER — CLINDAMYCIN PHOSPHATE 900 MG/50ML
900 INJECTION INTRAVENOUS EVERY 8 HOURS
Status: COMPLETED | OUTPATIENT
Start: 2023-04-13 | End: 2023-04-14

## 2023-04-13 RX ORDER — CHOLECALCIFEROL (VITAMIN D3) 125 MCG
5 CAPSULE ORAL NIGHTLY PRN
Status: DISCONTINUED | OUTPATIENT
Start: 2023-04-13 | End: 2023-04-14 | Stop reason: HOSPADM

## 2023-04-13 RX ORDER — DIPHENHYDRAMINE HCL 25 MG
25 CAPSULE ORAL EVERY 6 HOURS PRN
Status: DISCONTINUED | OUTPATIENT
Start: 2023-04-13 | End: 2023-04-14 | Stop reason: HOSPADM

## 2023-04-13 RX ORDER — IBUPROFEN 600 MG/1
1 TABLET ORAL
Status: DISCONTINUED | OUTPATIENT
Start: 2023-04-13 | End: 2023-04-14 | Stop reason: HOSPADM

## 2023-04-13 RX ORDER — ACETAMINOPHEN 500 MG
1000 TABLET ORAL ONCE
Status: COMPLETED | OUTPATIENT
Start: 2023-04-13 | End: 2023-04-13

## 2023-04-13 RX ORDER — NALOXONE HCL 0.4 MG/ML
0.2 VIAL (ML) INJECTION AS NEEDED
Status: DISCONTINUED | OUTPATIENT
Start: 2023-04-13 | End: 2023-04-13 | Stop reason: HOSPADM

## 2023-04-13 RX ORDER — LABETALOL HYDROCHLORIDE 5 MG/ML
INJECTION, SOLUTION INTRAVENOUS AS NEEDED
Status: DISCONTINUED | OUTPATIENT
Start: 2023-04-13 | End: 2023-04-13 | Stop reason: SURG

## 2023-04-13 RX ADMIN — METFORMIN HYDROCHLORIDE 1000 MG: 1000 TABLET, FILM COATED ORAL at 17:48

## 2023-04-13 RX ADMIN — ROPIVACAINE HYDROCHLORIDE 30 ML: 2 INJECTION, SOLUTION EPIDURAL; INFILTRATION at 06:36

## 2023-04-13 RX ADMIN — LABETALOL HYDROCHLORIDE 5 MG: 5 INJECTION, SOLUTION INTRAVENOUS at 11:47

## 2023-04-13 RX ADMIN — Medication 10 ML: at 21:56

## 2023-04-13 RX ADMIN — SODIUM CHLORIDE, POTASSIUM CHLORIDE, SODIUM LACTATE AND CALCIUM CHLORIDE: 600; 310; 30; 20 INJECTION, SOLUTION INTRAVENOUS at 09:07

## 2023-04-13 RX ADMIN — SODIUM CHLORIDE 100 ML/HR: 4.5 INJECTION, SOLUTION INTRAVENOUS at 16:19

## 2023-04-13 RX ADMIN — TRANEXAMIC ACID 1000 MG: 1 INJECTION, SOLUTION INTRAVENOUS at 07:20

## 2023-04-13 RX ADMIN — MIDAZOLAM 1 MG: 1 INJECTION INTRAMUSCULAR; INTRAVENOUS at 06:34

## 2023-04-13 RX ADMIN — LISINOPRIL 30 MG: 20 TABLET ORAL at 16:20

## 2023-04-13 RX ADMIN — HYDROMORPHONE HYDROCHLORIDE 0.5 MG: 1 INJECTION, SOLUTION INTRAMUSCULAR; INTRAVENOUS; SUBCUTANEOUS at 10:12

## 2023-04-13 RX ADMIN — ONDANSETRON 4 MG: 2 INJECTION INTRAMUSCULAR; INTRAVENOUS at 09:03

## 2023-04-13 RX ADMIN — PROPOFOL 400 MG: 10 INJECTION, EMULSION INTRAVENOUS at 07:06

## 2023-04-13 RX ADMIN — LABETALOL HYDROCHLORIDE 5 MG: 5 INJECTION, SOLUTION INTRAVENOUS at 07:33

## 2023-04-13 RX ADMIN — FENTANYL CITRATE 50 MCG: 50 INJECTION, SOLUTION INTRAMUSCULAR; INTRAVENOUS at 09:54

## 2023-04-13 RX ADMIN — SODIUM CHLORIDE, POTASSIUM CHLORIDE, SODIUM LACTATE AND CALCIUM CHLORIDE: 600; 310; 30; 20 INJECTION, SOLUTION INTRAVENOUS at 06:56

## 2023-04-13 RX ADMIN — CELECOXIB 200 MG: 200 CAPSULE ORAL at 06:17

## 2023-04-13 RX ADMIN — INSULIN LISPRO 4 UNITS: 100 INJECTION, SOLUTION INTRAVENOUS; SUBCUTANEOUS at 21:52

## 2023-04-13 RX ADMIN — HYDROCHLOROTHIAZIDE 25 MG: 25 TABLET ORAL at 16:20

## 2023-04-13 RX ADMIN — OXYCODONE HYDROCHLORIDE AND ACETAMINOPHEN 1 TABLET: 7.5; 325 TABLET ORAL at 10:12

## 2023-04-13 RX ADMIN — HYDROMORPHONE HYDROCHLORIDE 0.5 MG: 1 INJECTION, SOLUTION INTRAMUSCULAR; INTRAVENOUS; SUBCUTANEOUS at 07:22

## 2023-04-13 RX ADMIN — DEXAMETHASONE SODIUM PHOSPHATE 4 MG: 4 INJECTION, SOLUTION INTRAMUSCULAR; INTRAVENOUS at 06:36

## 2023-04-13 RX ADMIN — CEFAZOLIN 3 G: 10 INJECTION, POWDER, FOR SOLUTION INTRAVENOUS at 06:52

## 2023-04-13 RX ADMIN — ACETAMINOPHEN 1000 MG: 500 TABLET ORAL at 06:17

## 2023-04-13 RX ADMIN — LIDOCAINE HYDROCHLORIDE 100 MG: 20 INJECTION, SOLUTION EPIDURAL; INFILTRATION; INTRACAUDAL; PERINEURAL at 07:06

## 2023-04-13 RX ADMIN — FENTANYL CITRATE 50 MCG: 50 INJECTION, SOLUTION INTRAMUSCULAR; INTRAVENOUS at 06:34

## 2023-04-13 RX ADMIN — DEXAMETHASONE SODIUM PHOSPHATE 8 MG: 4 INJECTION, SOLUTION INTRAMUSCULAR; INTRAVENOUS at 07:15

## 2023-04-13 RX ADMIN — ROCURONIUM BROMIDE 50 MG: 10 INJECTION, SOLUTION INTRAVENOUS at 07:06

## 2023-04-13 RX ADMIN — LABETALOL HYDROCHLORIDE 5 MG: 5 INJECTION, SOLUTION INTRAVENOUS at 11:53

## 2023-04-13 RX ADMIN — LABETALOL HYDROCHLORIDE 5 MG: 5 INJECTION, SOLUTION INTRAVENOUS at 10:40

## 2023-04-13 RX ADMIN — CEFAZOLIN SODIUM 2 G: 2 INJECTION, SOLUTION INTRAVENOUS at 08:49

## 2023-04-13 RX ADMIN — OXYCODONE AND ACETAMINOPHEN 1 TABLET: 5; 325 TABLET ORAL at 23:20

## 2023-04-13 RX ADMIN — CLINDAMYCIN PHOSPHATE 900 MG: 900 INJECTION, SOLUTION INTRAVENOUS at 16:21

## 2023-04-13 RX ADMIN — LABETALOL HYDROCHLORIDE 5 MG: 5 INJECTION, SOLUTION INTRAVENOUS at 07:30

## 2023-04-13 RX ADMIN — SUGAMMADEX 200 MG: 100 INJECTION, SOLUTION INTRAVENOUS at 09:07

## 2023-04-13 NOTE — DISCHARGE PLACEMENT REQUEST
"Sebastian Sanchez (62 y.o. Male)     Date of Birth   1960    Social Security Number       Address   [address] [place] [address]    Home Phone   [phone]    MRN   [id number]       [facility]    Marital Status                               Admission Date   4/13/23    Admission Type   Elective    Admitting Provider   Oswaldo West MD    Attending Provider   Oswaldo West MD    Department, Room/Bed   [facility], [facility]       Discharge Date       Discharge Disposition       Discharge Destination                               Attending Provider: Oswaldo West MD    Allergies: No Known Allergies    Isolation: None   Infection: None   Code Status: Not on file    Ht: 198.1 cm (78\")   Wt: 139 kg (306 lb 7 oz)    Admission Cmt: None   Principal Problem: Primary osteoarthritis of right hip [M16.11]                 Active Insurance as of 4/13/2023     Primary Coverage     Payor Plan Insurance Group Employer/Plan Group    [employer] BLUE [employer]OSS [employer]UE [employer] BLUE [employer] PPO [id number]     Payor Plan Address Payor Plan Phone Number Payor Plan Fax Number Effective Dates    [address] [phone]  10/1/2017 - None Entered    [place] [address]       Subscriber Name Subscriber Birth Date Member ID       SEBASTIAN SANCHEZ 1960 [id number]                 Emergency Contacts      (Rel.) Home Phone Work Phone Mobile Phone    AZALIA SANCHEZ (Spouse) [phone] -- [phone]            "

## 2023-04-13 NOTE — ANESTHESIA PROCEDURE NOTES
Airway  Urgency: elective    Date/Time: 4/13/2023 7:07 AM  Airway not difficult    General Information and Staff    Patient location during procedure: OR  Anesthesiologist: Maryjo, Presley Leavitt MD  CRNA/CAA: Wendie Gold CRNA    Indications and Patient Condition  Indications for airway management: airway protection    Preoxygenated: yes  MILS not maintained throughout  Mask difficulty assessment: 1 - vent by mask    Final Airway Details  Final airway type: endotracheal airway      Successful airway: ETT  Cuffed: yes   Successful intubation technique: direct laryngoscopy  Endotracheal tube insertion site: oral  Blade: Nancy  Blade size: 4  ETT size (mm): 7.5  Cormack-Lehane Classification: grade I - full view of glottis  Placement verified by: chest auscultation and capnometry   Cuff volume (mL): 8  Measured from: lips  ETT/EBT  to lips (cm): 23  Number of attempts at approach: 1  Assessment: lips, teeth, and gum same as pre-op and atraumatic intubation    Additional Comments  Pt preoxygenated prior to induction, easy mask airway, atraumatic intubation,+ ETCO2, + bs bilat,  ETT secured and connected to ventilator.

## 2023-04-13 NOTE — OP NOTE
Orthopaedic Surgery Operative Note    Patient Name:  Sebastian Orlando  YOB: 1960  Age: 62 y.o.  Medical Records Number:  [id number]    Date of Procedure:  4/13/2023    Pre-operative Diagnosis:  Primary Osteoarthritis Right Hip    Post-operative Diagnosis:  Primary Osteoarthritis Right Hip    Procedure Performed:  Right Total Hip Arthroplasty                                          Layered Closure    Surgical Approach: Hip Posterior    Implants:  Biomet 62 mm G7 Acetabular Shell, 15 mm Standard Offset Taperloc Complete Stem, 40 mm High-wall Polyethylene Liner, Standard 40 mm Ceramic Head    Surgeon:  Oswaldo West M.D.    Assistant: Earlene Mckinley (who was present during the critical portions of the case, thereby decreasing operative time and patient morbidity)    Anesthetic Type:  General    Estimated Blood Loss:  250cc's    Specimens: * No orders in the log *    No Complications      Indications for Procedure:  Sebastian Orlando is a 62 y.o. male suffering from end stage degenerative changes in the right hip.  The patients pain is becoming disabling, despite extensive conservative care, including NSAIDS, activity modification and therapy.  The risks, benefits and alternatives were discussed and the patient wishes to proceed with right total hip arthroplasty.      Procedure Performed:    After informed consent was obtained, the correct patient identified, the correct operative side marked by the operative surgeon and pre-operative IV antibiotics given, the patient was taken to the operating room and placed supine on the operating table.  After general anesthesia was induced, a surgical time out was performed and 1 gm of IV Tranxemic Acid given, the patient was placed in the left lateral decubitus position and the right lower extremity was prepped with chloraprep and draped in a sterile fashion.    An incision was made overlying the right hip.  We sharply dissected down to expose the fascia over  the gluteus darren and the Iliotibial band.  We split the fascia in line with the skin incision and placed a Charnley retractor carefully to avoid damage to the Sciatic Nerve.  We then began our posterior approach to the hip by identifying the short external rotators and tagging these with a #5 Tevdek and releasing them from their insertion on the femur.  We then identified the posterior capsule, released the capsule from it's insertion on the femur and tagged the capsule proximally and distally with a #5 Tevdek.  We then dislocated the hip and made our neck cut roughly 2-3 mm proximal to the lesser trochanter. We measured the head to be 60 mm and our neck cut to be 70 mm.      We then gained exposure of the acetabulum, removed the pulvinar and labrum, then we sequentially reamed from a 36 mm reamer up to a 61 mm reamer.  We had excellent interference fit and a nice bleeding, bony bed for our acetabular component.  We copiously irrigated the acetabulum, then impacted our permanent acetabular component into place.  We assured we were completely seated by checking through the apical hole, we placed two 6.5 mm cancellous screws for rotational stability, then placed our permanent liner.    We then turned our attention to the femur where we cleared the trochanteric fossa of soft tissue.  We entered the canal with a box chisel, hand held reamer and lateralizing reamer.  We then sequentially broached from a 4 mm broach up to a 15 mm broach.  We trialed with both a standard neck and high offset neck with the std 40 mm head and had excellent stability, range of motion and leg length equality with the std offset neck.  An intraoperative radiograph revealed excellent implant position and alignment.  We removed our trials and copiously irrigated the hip.  We then placed our permanent 15 std offset stem and trialed again with a -3, std and +3 40 mm heads.  The std 40 mm head gave us the best range of motion, stability and leg  length equality.  We removed the trials, copiously irrigated the hip with dilute betadine solution, cleaned and dried the trunion, then impacted our permanent head.  We then reduced the hip and began our layered closure after placing our local anesthetic and re-dosing our IV antibiotics.  We closed the short external rotators and posterior capsule through two drill holes in the greater trochanter and a soft tissue stitch in the Gluteus Medius.  We closed the deep fascia with a #1 Vicryl, the deep subcutaneous tissue with a #1 Vicryl, the subcutaneous tissue with 2-0 Vicryl and the skin with 3-0 Monocryl and a Zip-hector.  We placed a sterile dressing of Xeroform and an Island dressing.  All sponge and needle counts were correct.  The patient was awakened from general anesthesia and taken to the recovery room in stable condition.    The patient will be started on Aspirin 325 mg twice daily for DVT prophylaxis.  IV antibiotics will be discontinued within 24 hours of surgery.  Immediately prior to surgery, there were no acute Thromboembolic nor Cardiovascular risk factors.  An updated Medical Reconciliation form is on the chart.    Oswaldo Mckinley PA-C  [facility]  [address] [place] [id number] [phone]    4/13/2023

## 2023-04-13 NOTE — ANESTHESIA PROCEDURE NOTES
Peripheral Block    Pre-sedation assessment completed: 4/13/2023 6:35 AM    Patient reassessed immediately prior to procedure    Patient location during procedure: pre-op  Start time: 4/13/2023 6:36 AM  Stop time: 4/13/2023 6:38 AM  Reason for block: at surgeon's request and post-op pain management  Performed by  Anesthesiologist: Srinivas Cmapbell MD  Preanesthetic Checklist  Completed: patient identified, IV checked, site marked, risks and benefits discussed, surgical consent, monitors and equipment checked, pre-op evaluation and timeout performed  Prep:  Pt Position: supine  Sterile barriers:cap, mask and gloves  Prep: ChloraPrep  Patient monitoring: blood pressure monitoring, continuous pulse oximetry and EKG  Procedure    Sedation: yes  Performed under: local infiltration  Guidance:ultrasound guided    ULTRASOUND INTERPRETATION.  Using ultrasound guidance a 21 G gauge needle was placed in close proximity to the nerve, at which point, under ultrasound guidance anesthetic was injected in the area of the nerve and spread of the anesthesia was seen on ultrasound in close proximity thereto.  There were no abnormalities seen on ultrasound; a digital image was taken; and the patient tolerated the procedure with no complications. Images:still images obtained, printed/placed on chart    Laterality:right  Block Type:fascia iliaca compartment  Injection Technique:single-shot  Needle Type:echogenic and Tuohy  Needle Gauge:21 G  Resistance on Injection: none    Medications Used: dexamethasone (DECADRON) injection - Injection   4 mg - 4/13/2023 6:36:00 AM  ropivacaine (NAROPIN) 0.2 % injection - Injection   30 mL - 4/13/2023 6:36:00 AM      Post Assessment  Injection Assessment: negative aspiration for heme, no paresthesia on injection and incremental injection  Patient Tolerance:comfortable throughout block  Complications:no  Additional Notes  Ultrasound guidance used to visualize nerve anatomy, guide needle placement and  verify local anesthetic disbursement.

## 2023-04-13 NOTE — DISCHARGE SUMMARY
Orthopaedic Surgery Discharge Summary    Patient Name:  Sebastian Orlando  YOB: 1960  Age: 62 y.o.  Medical Records Number:  [id number]    Date of Admission:  4/13/2023  Date of Discharge:  4/14/2023    Primary Discharge Diagnosis:  Primary osteoarthritis of right hip [M16.11]    Secondary Discharge Diagnosis:    Problems Addressed this Visit        Musculoskeletal and Injuries    * (Principal) Primary osteoarthritis of right hip - Primary    Relevant Medications    oxyCODONE-acetaminophen (PERCOCET) 5-325 MG per tablet   Diagnoses       Codes Comments    Primary osteoarthritis of right hip    -  Primary ICD-10-CM: M16.11  ICD-9-CM: 715.15           Procedures Performed:  Right Total Hip Arthroplasty      Hospital Course:    Sebastian Orlando is a 62 y.o.  male who underwent successful right darlene on 4/13/2023.  Sebastian Orlando was started on Aspirin 325 mg po twice daily  post-operatively for DVT prophylaxis.  On post-op day 1 the patients dressing was changed and their incision was clean, with no signs of infection and their calf was soft, with no signs of DVT.  The patient progressed well with physical therapy and the patients hemoglobin remained stable. On post-operative day 1 the patient was felt ready for discharge.     Vitals:  Vitals:    04/13/23 1215 04/13/23 1230 04/13/23 1300 04/13/23 1315   BP: 171/89 162/82 174/89 173/90   BP Location:       Patient Position:       Pulse: 85 90 92 95   Resp:   14 14   Temp:    98 °F (36.7 °C)   TempSrc:    Oral   SpO2: 96% 97% 97% 96%   Weight:       Height:           Discharge Medications:      Discharge Medications      New Medications      Instructions Start Date   docusate sodium 250 MG capsule  Commonly known as: COLACE   250 mg, Oral, 2 Times Daily PRN      oxyCODONE-acetaminophen 5-325 MG per tablet  Commonly known as: PERCOCET   1-2 tablets, Oral, Every 4 Hours PRN         Changes to Medications      Instructions Start Date   aspirin   MG tablet  What changed:   · medication strength  · how much to take  · when to take this   325 mg, Oral, 2 Times Daily With Meals   Start Date: April 14, 2023        Continue These Medications      Instructions Start Date   Ascorbic Acid 125 MG chewable tablet   Oral      famotidine 20 MG tablet  Commonly known as: PEPCID   20 mg, Oral, 2 Times Daily PRN      ferrous sulfate 324 MG tablet delayed-release   1 tablet, Oral, Daily      hydroCHLOROthiazide 25 MG tablet  Commonly known as: HYDRODIURIL   25 mg, Oral, Daily      lisinopril 30 MG tablet  Commonly known as: PRINIVIL,ZESTRIL   30 mg, Oral, Daily      metFORMIN 1000 MG tablet  Commonly known as: GLUCOPHAGE   1 tablet, Oral, 2 Times Daily With Meals      Ozempic (1 MG/DOSE) 4 MG/3ML solution pen-injector  Generic drug: Semaglutide (1 MG/DOSE)   2 mg, Subcutaneous, Weekly, FRIDAYS, PT HASN'T HAD IN A COUPLE OF WEEKS         Stop These Medications    Chlorhexidine Gluconate Cloth 2 % pads     meloxicam 15 MG tablet  Commonly known as: MOBIC            Pain Medications:  Percocet 5/325 mg 1-2 po q 4-6 hours prn pain    DVT Prophylaxis:  Enteric Coated Aspirin 325 mg po twice daily for 2 weeks, then one daily for 4 weeks    Total Hip Replacement Discharge Instructions:    I. ACTIVITIES:  1. Exercises:  ? Complete exercise program as taught by the hospital physical therapist 2 times per day  ? Exercise program will be advanced by the physical therapist  ? During the day be up ambulating every 2 hours (while awake) for short distances  ? Complete the ankle pump exercises at least 10 times per hour (while awake)  ? Elevate legs when in bed and for at least 30 minutes during the day.Use cold packs 20-30 minutes approximately 5 times per day. This should be done before and after completing your exercises and at any time you are experiencing pain/ stiffness in your operative extremity.      2. Activities of Daily Living:  ? No tub baths, hot tubs, or swimming pools for  4 weeks  ? May shower and let water run over the incision on post-operative day #5 if no drainage. Do not scrub or rub the incision. Simply let the water run over the incision and pat dry.    II. Restrictions  ? Continue hip precautions as taught at the hospital  ? Your surgeon will discuss with you when you will be able to drive again.  ? Weight bearing is as tolerated  ? First week stay inside on even terrain. May go up and down stairs one stair at a time utilizing the hand rail once cleared by physical therapy to do so.  ? After one week, you may venture outside (if cleared to do so by physical therapist).    III. Precautions:  ? Everyone that comes near you should wash their hands  ? No elective dental, genital-urinary, or colon procedures or surgical procedures for 12 weeks after surgery unless absolutely necessary.  ?  If dental work or surgical procedure is deemed absolutely necessary, you will need to contact your surgeon as you will need to take antibiotics 1 hour prior to any dental work (including teeth cleanings).  ? Please discuss with your surgeon prophylactic antibiotics as the length of time this intervention will be necessary for you varies with each patient’s health history and situation.  ? Avoid sick people. If you must be around someone who is ill, they should wear a mask.  ? Avoid visits to the Emergency Room or Urgent Care unless you are having a life threatening event.   ? If ordered stockings are to be placed on in the morning and removed at night. Monitor the stockings to ensure that any swelling is not causing the stockings to become too tight. In this case, remove stockings immediately.    IV. INCISION CARE:  ? Wash your hands prior to dressing changes  ? Change the dressing as needed to keep incision clean and dry. Utilize dry gauze and paper tape. Avoid touching the side of the gauze that goes against the incision with your hands.  ? No creams or ointments to the incision  ? May remove  dressing once the incision is free of drainage  ? Do not touch or pick at the incision  ? Check incision every day and notify surgeon immediately if any of the following signs or symptoms are noted:  o Increase in redness  o Increase in swelling around the incision and of the entire extremity  o Increase in pain  o Drainage oozing from the incision  o Pulling apart of the edges of the incision  o Increase in overall body temperature (greater than 100.5 degrees)  ? Your surgeon will instruct you regarding suture or staple removal    V. Medications:   1. Anticoagulants: You will be discharged on an anticoagulant. This is a prophylactic medication that helps prevent blood clots during your post-operative period. The type and length of dosage varies based on your individual needs, procedure performed, and surgeon’s preference.  ? While taking the anticoagulant, you should avoid taking any additional aspirin, ibuprofen (Advil or Motrin), Aleve (Naprosyn) or other non-steroidal anti-inflammatory medications.   ? Notify surgeon immediately if any namita bleeding is noted in the urine, stool, emesis, or from the nose or the incision. Blood in the stool will often appear as black rather than red. Blood in urine may appear as pink. Blood in emesis may appear as brown/black like coffee grounds.  ? You will need to apply pressure for longer periods of time to any cuts or abrasions to stop bleeding  ? Avoid alcohol while taking anticoagulants    2. Stool Softeners: You will be at greater risk of constipation after surgery due to being less mobile and the pain medications.   ? Take stool softeners as instructed by your surgeon while on pain medications. Over the counter Colace 100 mg 1-2 capsules twice daily.   ? If stools become too loose or too frequent, please decreases the dosage or stop the stool softener.  ? If constipation occurs despite use of stool softeners, you are to continue the stool softeners and add a laxative  (Milk of Magnesia 1 ounce daily as needed)  ? Drink plenty of fluids, and eat fruits and vegetables during your recovery time    3. Pain Medications utilized after surgery are narcotics and the law requires that the following information be given to all patients that are prescribed narcotics:  ? CLASSIFICATION: Pain medications are called Opioids and are narcotics  ? LEGALITIES: It is illegal to share narcotics with others and to drive within 24 hours of taking narcotics  ? POTENTIAL SIDE EFFECTS: Potential side effects of opioids include: nausea, vomiting, itching, dizziness, drowsiness, dry mouth, constipation, and difficulty urinating.  ? POTENTIAL ADVERSE EFFECTS:   o Opioid tolerance can develop with use of pain medications and this simply means that it requires more and more of the medication to control pain; however, this is seen more in patients that use opioids for longer periods of time.  o Opioid dependence can develop with use of Opioids and this simply means that to stop the medication can cause withdrawal symptoms; however, this is seen with patients that use Opioids for longer periods of time.  o Opioid addiction can develop with use of Opioids and the incidence of this is very unlikely in patients who take the medications as ordered and stop the medications as instructed.  o Opioid overdose can be dangerous, but is unlikely when the medication is taken as ordered and stopped when ordered. It is important not to mix opioids with alcohol or with and type of sedative such as Benadryl as this can lead to over sedation and respiratory difficulty.  ? DOSAGE:   o Pain medications will need to be taken consistently for the first week to decrease pain and promote adequate pain relief and participation in physical therapy.  o After the initial surgical pain begins to resolve, you may begin to decrease the pain medication. By the end of 6-8 weeks, you should be off of pain medications.  o Refills will not be  given by the office during evening hours, on weekends, or after 6-8 weeks post-op.  o To seek refills on pain medications during the initial 6 week post-operative period, you must call the office 48 hours in advance to request the refill. The office will then notify you when to  the prescription. DO NOT wait until you are out of the medication to request a refill.    V. FOLLOW-UP VISITS:  ? You will need to follow up in the office with your surgeon in 3 weeks. Please call this number [phone]  to schedule this appointment.  If you have any concerns or suspected complications prior to your follow up visit, please call your surgeons office. Do not wait until your appointment time if you suspect complications. These will need to be addressed in the office promptly.    Oswaldo Mckinley PA-C  [facility]  [address] [place] [address]  [phone]    4/13/2023    CC:Alonzo April, APRN:Oswaldo West MD

## 2023-04-13 NOTE — THERAPY EVALUATION
Patient Name: Sebastian Orlando  : 1960    MRN: [id number]                              Today's Date: 2023       Admit Date: 2023    Visit Dx: No diagnosis found.  Patient Active Problem List   Diagnosis   • Primary osteoarthritis of right hip     Past Medical History:   Diagnosis Date   • Anemia     TAKING IRON   • Arthritis     HIPS   • Diabetes mellitus    • GERD (gastroesophageal reflux disease)    • History of migraine     SEVERAL YEARS AGO   • Hypertension    • Right hip pain      Past Surgical History:   Procedure Laterality Date   • CATARACT EXTRACTION, BILATERAL Bilateral    • COLONOSCOPY     • EYE PTOSIS REPAIR Right 2022    Procedure: RIGHT UPPER LID PTOSIS REPAIR;  Surgeon: Cristian Alvarenga MD;  Location: [facility];  Service: Ophthalmology;  Laterality: Right;   • TESTICLE UNDESCENDED REPAIR     • TOTAL HIP ARTHROPLASTY Left       General Information     Row Name 23 1522          Physical Therapy Time and Intention    Mode of Treatment physical therapy  -     Row Name 23 1522          General Information    Patient Profile Reviewed yes  -EJ     Prior Level of Function independent:;ADL's;all household mobility;community mobility  -EJ     Existing Precautions/Restrictions hip, posterior;right  -EJ     Row Name 23 1522          Living Environment    People in Home spouse  -     Row Name 23 1522          Home Main Entrance    Number of Stairs, Main Entrance three  -EJ     Stair Railings, Main Entrance railings safe and in good condition  -EJ     Row Name 23 1522          Cognition    Orientation Status (Cognition) oriented x 4  -EJ     Row Name 23 1522          Safety Issues, Functional Mobility    Impairments Affecting Function (Mobility) pain;strength;range of motion (ROM)  -           User Key  (r) = Recorded By, (t) = Taken By, (c) = Cosigned By    Initials Name Provider Type    EJ Amber Huddleston, PT Physical Therapist                Mobility     Row Name 04/13/23 1522          Bed Mobility    Bed Mobility supine-sit;sit-supine  -EJ     Supine-Sit [place] (Bed Mobility) verbal cues;minimum assist (75% patient effort)  -EJ     Sit-Supine [place] (Bed Mobility) verbal cues;minimum assist (75% patient effort)  -EJ     Assistive Device (Bed Mobility) head of bed elevated;bed rails  -EJ     Comment, (Bed Mobility) min A w RLE  -EJ     Row Name 04/13/23 1522          Sit-Stand Transfer    Sit-Stand [place] (Transfers) verbal cues;contact guard  -EJ     Assistive Device (Sit-Stand Transfers) walker, front-wheeled  -EJ     Row Name 04/13/23 1522          Gait/Stairs (Locomotion)    [place] Level (Gait) verbal cues;contact guard  -EJ     Assistive Device (Gait) walker, front-wheeled  -EJ     Distance in Feet (Gait) 60  -EJ     Deviations/Abnormal Patterns (Gait) john decreased;antalgic;stride length decreased  -EJ     Bilateral Gait Deviations forward flexed posture  -EJ     Comment, (Gait/Stairs) cues for sequence, no unsteadiness  -EJ           User Key  (r) = Recorded By, (t) = Taken By, (c) = Cosigned By    Initials Name Provider Type    EJ Amber Huddleston, PT Physical Therapist               Obj/Interventions     Row Name 04/13/23 1525          Range of Motion Comprehensive    General Range of Motion no range of motion deficits identified  -EJ     Comment, General Range of Motion x R hip  -EJ     Row Name 04/13/23 1525          Strength Comprehensive (MMT)    Comment, General Manual Muscle Testing (MMT) Assessment post op weakness  -EJ     Row Name 04/13/23 1525          Motor Skills    Therapeutic Exercise --  R THR protocol x 5 reps  -EJ           User Key  (r) = Recorded By, (t) = Taken By, (c) = Cosigned By    Initials Name Provider Type    EJ Amber Huddleston, PT Physical Therapist               Goals/Plan     Row Name 04/13/23 1529          Bed Mobility Goal 1 (PT)    Activity/Assistive Device (Bed  Mobility Goal 1, PT) bed mobility activities, all  -EJ     [place] Level/Cues Needed (Bed Mobility Goal 1, PT) standby assist  -EJ     Time Frame (Bed Mobility Goal 1, PT) 3 days  -EJ     Row Name 04/13/23 1529          Transfer Goal 1 (PT)    Activity/Assistive Device (Transfer Goal 1, PT) transfers, all;walker, rolling  -EJ     [place] Level/Cues Needed (Transfer Goal 1, PT) standby assist  -EJ     Time Frame (Transfer Goal 1, PT) 3 days  -EJ     Row Name 04/13/23 1529          Gait Training Goal 1 (PT)    Activity/Assistive Device (Gait Training Goal 1, PT) gait (walking locomotion);walker, rolling  -EJ     [place] Level (Gait Training Goal 1, PT) standby assist  -EJ     Distance (Gait Training Goal 1, PT) 100  -EJ     Time Frame (Gait Training Goal 1, PT) 3 days  -EJ     Row Name 04/13/23 1529          Stairs Goal 1 (PT)    Activity/Assistive Device (Stairs Goal 1, PT) stairs, all skills  -EJ     [place] Level/Cues Needed (Stairs Goal 1, PT) contact guard required  -EJ     Number of Stairs (Stairs Goal 1, PT) 3  -EJ     Time Frame (Stairs Goal 1, PT) 3 days  -EJ     Row Name 04/13/23 1529          Therapy Assessment/Plan (PT)    Planned Therapy Interventions (PT) balance training;bed mobility training;gait training;home exercise program;patient/family education;stretching;strengthening;stair training;ROM (range of motion);transfer training  -EJ           User Key  (r) = Recorded By, (t) = Taken By, (c) = Cosigned By    Initials Name Provider Type    Amber Ayala, PT Physical Therapist               Clinical Impression     Row Name 04/13/23 1526          Pain    Posttreatment Pain Rating 5/10  -EJ     Pain Location - Side/Orientation Right  -EJ     Pain Location - hip  -EJ     Pain Intervention(s) Repositioned;Ambulation/increased activity  -EJ     Row Name 04/13/23 1526          Plan of Care Review    Plan of Care Reviewed With patient  -EJ     Progress improving  -EJ     Outcome  Evaluation Pt seen for PT eval this afternoon. He is s/p R post THR and presents w expected post op pain and weakness. Pt able to perform supine <> sit w min A for RLE. He stood w CGA using Rwx and then ambulated approx 60 ft. CUes for sequence, no unsteadiness noted. Pt tolerating all hip exercises well. Reviewed hip precautions w pt. He verbalizes understanding. Pt plans home at [place], likely tomorrow. HE will continue to benefit from skilled PT to maximize safety, strength, and independence.  -     Row Name 04/13/23 1526          Therapy Assessment/Plan (PT)    Rehab Potential (PT) good, to achieve stated therapy goals  -EJ     Criteria for Skilled Interventions Met (PT) yes  -EJ     Therapy Frequency (PT) daily  -     Row Name 04/13/23 1526          Positioning and Restraints    Pre-Treatment Position in bed  -EJ     Post Treatment Position bed  -EJ     In Bed notified nsg;supine;call light within reach;encouraged to call for assist;exit alarm on;with family/caregiver  -           User Key  (r) = Recorded By, (t) = Taken By, (c) = Cosigned By    Initials Name Provider Type    EJ Amber Huddleston, PT Physical Therapist               Outcome Measures     Row Name 04/13/23 1529          How much help from another person do you currently need...    Turning from your back to your side while in flat bed without using bedrails? 3  -EJ     Moving from lying on back to sitting on the side of a flat bed without bedrails? 3  -EJ     Moving to and from a bed to a chair (including a wheelchair)? 3  -EJ     Standing up from a chair using your arms (e.g., wheelchair, bedside chair)? 3  -EJ     Climbing 3-5 steps with a railing? 3  -EJ     To walk in hospital room? 3  -EJ     AM-PAC 6 Clicks Score (PT) 18  -EJ     Highest level of mobility 6 --> Walked 10 steps or more  -     Row Name 04/13/23 1529          Functional Assessment    Outcome Measure Options AM-PAC 6 Clicks Basic Mobility (PT)  -EJ           User Key  (r) =  Recorded By, (t) = Taken By, (c) = Cosigned By    Initials Name Provider Type    Amber Ayala, PT Physical Therapist                             Physical Therapy Education     Title: PT OT SLP Therapies (In Progress)     Topic: Physical Therapy (In Progress)     Point: Mobility training (Done)     Learning Progress Summary           Patient Acceptance, E,TB,D, VU,NR by TYLER at 4/13/2023 1530                   Point: Home exercise program (Done)     Learning Progress Summary           Patient Acceptance, E,TB,D, VU,NR by  at 4/13/2023 1530                   Point: Body mechanics (Not Started)     Learner Progress:  Not documented in this visit.          Point: Precautions (Done)     Learning Progress Summary           Patient Acceptance, E,TB,D, VU,NR by  at 4/13/2023 1530                               User Key     Initials Effective Dates Name Provider Type [facility] 06/16/21 -  Amber Huddleston PT Physical Therapist PT              PT Recommendation and Plan  Planned Therapy Interventions (PT): balance training, bed mobility training, gait training, home exercise program, patient/family education, stretching, strengthening, stair training, ROM (range of motion), transfer training  Plan of Care Reviewed With: patient  Progress: improving  Outcome Evaluation: Pt seen for PT eval this afternoon. He is s/p R post THR and presents w expected post op pain and weakness. Pt able to perform supine <> sit w min A for RLE. He stood w CGA using Rwx and then ambulated approx 60 ft. CUes for sequence, no unsteadiness noted. Pt tolerating all hip exercises well. Reviewed hip precautions w pt. He verbalizes understanding. Pt plans home at [place], likely tomorrow. HE will continue to benefit from skilled PT to maximize safety, strength, and independence.     Time Calculation:    PT Charges     Row Name 04/13/23 1530             Time Calculation    Start Time 1455  -EJ      Stop Time 1517  -EJ      Time Calculation  (min) 22 min  -EJ      PT Received On 04/13/23  -EJ      PT - Next Appointment 04/14/23  -EJ      PT Goal Re-Cert Due Date 04/16/23  -EJ         Time Calculation- PT    Total Timed Code Minutes- PT 15 minute(s)  -EJ            User Key  (r) = Recorded By, (t) = Taken By, (c) = Cosigned By    Initials Name Provider Type    EJ Amber Huddleston, PT Physical Therapist              Therapy Charges for Today     Code Description Service Date Service Provider Modifiers Qty    39271667019 HC PT EVAL LOW COMPLEXITY 3 4/13/2023 Amber Huddleston, PT GP 1    12809530961 HC PT THER PROC EA 15 MIN 4/13/2023 Amber Huddleston, PT GP 1          PT G-Codes  Outcome Measure Options: AM-PAC 6 Clicks Basic Mobility (PT)  AM-PAC 6 Clicks Score (PT): 18  PT Discharge Summary  Anticipated Discharge Disposition (PT): home with assist, home with home health    Amber Huddleston, PT  4/13/2023

## 2023-04-13 NOTE — PLAN OF CARE
Goal Outcome Evaluation:  Plan of Care Reviewed With: patient        Progress: improving  Outcome Evaluation: Pt seen for PT eval this afternoon. He is s/p R post THR and presents w expected post op pain and weakness. Pt able to perform supine <> sit w min A for RLE. He stood w CGA using Rwx and then ambulated approx 60 ft. CUes for sequence, no unsteadiness noted. Pt tolerating all hip exercises well. Reviewed hip precautions w pt. He verbalizes understanding. Pt plans home at [place], likely tomorrow. HE will continue to benefit from skilled PT to maximize safety, strength, and independence.

## 2023-04-13 NOTE — ANESTHESIA PREPROCEDURE EVALUATION
Anesthesia Evaluation     no history of anesthetic complications:  NPO Solid Status: > 8 hours             Airway   Mallampati: II  TM distance: >3 FB  Neck ROM: full  No difficulty expected  Dental      Pulmonary    (+) a smoker Former,   (-) wheezes  Cardiovascular     ECG reviewed  Rhythm: regular    (-) murmur      Neuro/Psych  GI/Hepatic/Renal/Endo    (+)  GERD,  diabetes mellitus type 2,     Musculoskeletal     Abdominal    Substance History      OB/GYN          Other                        Anesthesia Plan    ASA 3     general     (  D/W R&B of GA including but not limited to: heart, lung, liver, kidney, neurologic problems, positioning injuries, dental damage, corneal abrasion and TMJ.  .)  intravenous induction     Anesthetic plan, risks, benefits, and alternatives have been provided, discussed and informed consent has been obtained with: patient.        CODE STATUS:

## 2023-04-13 NOTE — ANESTHESIA POSTPROCEDURE EVALUATION
"Patient: Sebastian Orlando    Procedure Summary     Date: 04/13/23 Room / Location:  [facility] [address] [place] [address] [facility]    Anesthesia Start: 0655 Anesthesia Stop: 0936    Procedure: TOTAL HIP ARTHROPLASTY POSERIOR (Right: Hip) Diagnosis:     Surgeons: Oswaldo West MD Provider: Maryjo, Presley Leavitt MD    Anesthesia Type: general ASA Status: 3          Anesthesia Type: general    Vitals  Vitals Value Taken Time   /90 04/13/23 1200   Temp 36.3 °C (97.4 °F) 04/13/23 0935   Pulse 93 04/13/23 1202   Resp 23 04/13/23 0935   SpO2 99 % 04/13/23 1202   Vitals shown include unvalidated device data.        Post Anesthesia Care and Evaluation    Patient location during evaluation: bedside  Patient participation: complete - patient participated  Level of consciousness: awake and alert  Pain management: adequate    Airway patency: patent  Anesthetic complications: No anesthetic complications    Cardiovascular status: acceptable  Respiratory status: acceptable  Hydration status: acceptable    Comments: BP (!) 182/90 Comment: Labetalol 5mg given  Pulse 86   Temp 36.3 °C (97.4 °F) (Oral)   Resp 23   Ht 198.1 cm (78\")   Wt (!) 139 kg (306 lb 7 oz)   SpO2 95%   BMI 35.41 kg/m²       "

## 2023-04-13 NOTE — H&P
"  Orthopaedic Surgery History and Physical    Patient Name:  Sebastian Orlando  YOB: 1960  Age: 62 y.o.  Medical Records Number:  [id number]    Date of Admission:  2023  5:06 AM    Chief Complaint:  No admission diagnoses are documented for this encounter.    Sebastian Orlando is a 62 y.o. male who presents c/o severe right hip pain.  The pain has been on and off for many years, worsening recently to the point where the pain is becoming disabling. The pain is a constant dull ache with occasional sharp, stabbing pains.  The patient has failed conservative treatment and would like to proceed with right total hip arthroplasty.    /86 (BP Location: Right arm, Patient Position: Sitting)   Pulse 74   Temp 97.9 °F (36.6 °C) (Oral)   Resp 18   Ht 198.1 cm (78\")   Wt (!) 139 kg (306 lb 7 oz)   SpO2 97%   BMI 35.41 kg/m²     Past Medical History:    Past Medical History:   Diagnosis Date   • Anemia     TAKING IRON   • Arthritis     HIPS   • Diabetes mellitus    • GERD (gastroesophageal reflux disease)    • History of migraine     SEVERAL YEARS AGO   • Hypertension    • Right hip pain        Past Surgical History:   Past Surgical History:   Procedure Laterality Date   • CATARACT EXTRACTION, BILATERAL Bilateral    • COLONOSCOPY     • EYE PTOSIS REPAIR Right 2022    Procedure: RIGHT UPPER LID PTOSIS REPAIR;  Surgeon: Cristian Alvarenga MD;  Location: [facility];  Service: Ophthalmology;  Laterality: Right;   • TESTICLE UNDESCENDED REPAIR     • TOTAL HIP ARTHROPLASTY Left        Social History:    Social History     Socioeconomic History   • Marital status:    Tobacco Use   • Smoking status: Former     Packs/day: 0.50     Years: 20.00     Pack years: 10.00     Types: Cigarettes     Quit date:      Years since quittin.2   • Smokeless tobacco: Never   Vaping Use   • Vaping Use: Never used   Substance and Sexual Activity   • Alcohol use: Not Currently     Comment: " SOCIALLY   • Drug use: Never   • Sexual activity: Defer       Family History:    Family History   Problem Relation Age of Onset   • Malig Hyperthermia Neg Hx        Current Medications:  Scheduled Meds:ceFAZolin, 3 g, Intravenous, Once  Orthopedic surgery custom cocktail, , Injection, Once  sodium chloride, 3 mL, Intravenous, Q12H      Continuous Infusions:lactated ringers, 9 mL/hr, Last Rate: 9 mL/hr (04/13/23 0614)      PRN Meds:.•  fentanyl  •  lidocaine PF 1%  •  midazolam  •  sodium chloride    Current Facility-Administered Medications:   •  ceFAZolin in Sodium Chloride (ANCEF) IVPB solution 3 g, 3 g, Intravenous, Once, Oswaldo West MD  •  fentaNYL citrate (PF) (SUBLIMAZE) injection 50 mcg, 50 mcg, Intravenous, Q10 Min PRN, Srinivas Campbell MD, 50 mcg at 04/13/23 0634  •  lactated ringers infusion, 9 mL/hr, Intravenous, Continuous, Srinivas Campbell MD, Last Rate: 9 mL/hr at 04/13/23 0614, 9 mL/hr at 04/13/23 0614  •  lidocaine PF 1% (XYLOCAINE) injection 0.5 mL, 0.5 mL, Injection, Once PRN, Srinivas Campbell MD  •  midazolam (VERSED) injection 1 mg, 1 mg, Intravenous, Q10 Min PRN, Srinivas Campbell MD, 1 mg at 04/13/23 0634  •  ropivacaine 0.5 % 50 mL, Morphine 5 mg, ketorolac 30 mg, EPINEPHrine 1 mg/mL 0.3 mg in sodium chloride 0.9 % 50 mL solution, , Injection, Once, Oswaldo West MD  •  sodium chloride 0.9 % flush 3 mL, 3 mL, Intravenous, Q12H, Srinivas Campbell MD  •  sodium chloride 0.9 % flush 3-10 mL, 3-10 mL, Intravenous, PRN, Srinivas Campbell MD    Allergies:  No Known Allergies    Review of Systems:   HEENT: Patient denies any headaches, vision changes, change in hearing, or tinnitus, Patient denies any rhinorrhea,epistaxis, sinus pain, mouth or dental problems, sore throat or hoarseness, or dysphagia  Pulmonary: Patient denies any cough, congestion, SOA, or wheezing  Cardiovascular: Patient denies any chest pain, dyspnea, palpitations, weakness, intolerance of exercise,  varicosities, swelling of extremities, known murmur  Gastrointestinal:  Patient denies nausea, vomiting, diarrhea, constipation, loss  of appetite, change in appetite, dysphagia, gas, heartburn, melena, change in bowel habits, use of laxatives or other drugs to alter the function of the gastrointestinal tract.  Genital/Urinary: Patient denies dysuria, change in color of urine, change in frequency of urination, pain with urgency, incontinence, retention, or nocturia.  Musculoskeletal: Patient denies increased warmth; redness; or swelling of joints; limitation of function; deformity; crepitation: pain in a joint or an extremity, the neck, or the back, especially with movement.  Neurological: Patient denies dizziness, tremor, ataxia, difficulty in speaking, change in speech, paresthesia, loss of sensation, seizures, syncope, changes in memory.  Endocrine system: Patient denies tremors, palpitations, intolerance of heat or cold, polyuria, polydipsia, polyphagia, diaphoresis, exophthalmos, or goiter.  Psychological: Patient denies thoughts/plans or harming self or other; depression,  insomnia, night terrors, codi, memory loss, disorientation.  Skin: Patient denies any bruising, rashes, discoloration, pruritus, wounds, ulcers, decubiti, changes in the hair or nails  Hematopoietic: Patient denies history of spontaneous or excessive bleeding, epistaxis, hematuria, melena, fatigue, enlarged or tender lymph nodes, pallor, history of anemia.        Physical Exam:  Awake, A&O x3, affect normal, no acute distress  Ambulating with a limp due to hip pain  Hip ROM is limited due to pain  No instability  Strength is 4/5 in the quad, hamstring, abduction and adduction  Cap refill is normal, Sensation intact    Card:  RR, HD Stable  Pulm:  Regular breathing, no S.O.A  Abd:  Soft, NT, ND    Lab Results (last 24 hours)     Procedure Component Value Units Date/Time    POC Glucose Once [[id number]]  (Abnormal) Collected: 04/13/23 3805     Specimen: Blood Updated: 04/13/23 0541     Glucose 160 mg/dL      Comment: Meter: [id number] : [id number]             Peripheral Block    Result Date: 4/13/2023  Narrative: Srinivas Campbell MD     4/13/2023  6:43 AM Peripheral Block Pre-sedation assessment completed: 4/13/2023 6:35 AM Patient reassessed immediately prior to procedure Patient location during procedure: pre-op Start time: 4/13/2023 6:36 AM Stop time: 4/13/2023 6:38 AM Reason for block: at surgeon's request and post-op pain management Performed by Anesthesiologist: Srinivas Campbell MD Preanesthetic Checklist Completed: patient identified, IV checked, site marked, risks and benefits discussed, surgical consent, monitors and equipment checked, pre-op evaluation and timeout performed Prep: Pt Position: supine Sterile barriers:cap, mask and gloves Prep: ChloraPrep Patient monitoring: blood pressure monitoring, continuous pulse oximetry and EKG Procedure Sedation: yes Performed under: local infiltration Guidance:ultrasound guided ULTRASOUND INTERPRETATION.  Using ultrasound guidance a 21 G gauge needle was placed in close proximity to the nerve, at which point, under ultrasound guidance anesthetic was injected in the area of the nerve and spread of the anesthesia was seen on ultrasound in close proximity thereto.  There were no abnormalities seen on ultrasound; a digital image was taken; and the patient tolerated the procedure with no complications. Images:still images obtained, printed/placed on chart Laterality:right Block Type:fascia iliaca compartment Injection Technique:single-shot Needle Type:echogenic and Tuohy Needle Gauge:21 G Resistance on Injection: none Medications Used: dexamethasone (DECADRON) injection - Injection  4 mg - 4/13/2023 6:36:00 AM ropivacaine (NAROPIN) 0.2 % injection - Injection  30 mL - 4/13/2023 6:36:00 AM Post Assessment Injection Assessment: negative aspiration for heme, no paresthesia on injection  and incremental injection Patient Tolerance:comfortable throughout block Complications:no Additional Notes Ultrasound guidance used to visualize nerve anatomy, guide needle placement and verify local anesthetic disbursement.     XR Hip With or Without Pelvis 2 - 3 View Right, XR Chest PA & Lateral    Result Date: 4/3/2023  Narrative: XR CHEST PA AND LATERAL-, XR HIP W OR WO PELVIS 2-3 VIEW RIGHT-  CLINICAL: Preop right hip surgery.  COMPARISON: Pelvis dated 03/19/2013, chest radiograph 03/19/2013.  CHEST FINDINGS: Collar artifact superimposes the lung apices. Heart size within normal limits. The mediastinum and marc are stable. Lungs are clear.  CONCLUSION: No active disease of the chest  AP PELVIS AND RIGHT HIP FINDINGS: Total left hip replacement prosthesis is satisfactory in appearance. The right hemipelvis is normal. There is right hip joint narrowing, this has progressed since 2013 examination. Near bone-on-bone configuration. There is periarticular bone hypertrophy and subchondral sclerosis also seen. No acute osseous or articular abnormality seen. Surrounding soft tissues have a satisfactory appearance.  CONCLUSION: Significant right hip joint degeneration which has progressed since 2013.  This report was finalized on 4/3/2023 1:59 PM by Dr. Moe Nunez M.D.          Assessment:  End-stage Primary Right Hip Osteoarthritis    Plan:  Patient's pain is becoming disabling, despite extensive conservative treatment.  Radiographs reveal end-stage degenerative changes.  The risks of surgery, including, but not limited to, heart attack, stroke, dying, DVT, leg length inequality, nerve injury, vascular injury, stiffness and infection were discussed.  The alternatives and benefits were also discussed.  All questions answered and the patient wishes to proceed with right total hip arthroplasty.    Oswaldo Mckinley PACARRIE  [facility]  [address] [place] [phone]  [phone]    4/13/2023    CC: Adriana Rosado APRN, Oswaldo West MD

## 2023-04-14 ENCOUNTER — HOME HEALTH ADMISSION (OUTPATIENT)
Dept: HOME HEALTH SERVICES | Facility: HOME HEALTHCARE | Age: 63
End: 2023-04-14
Payer: COMMERCIAL

## 2023-04-14 ENCOUNTER — TRANSCRIBE ORDERS (OUTPATIENT)
Dept: HOME HEALTH SERVICES | Facility: HOME HEALTHCARE | Age: 63
End: 2023-04-14
Payer: COMMERCIAL

## 2023-04-14 VITALS
RESPIRATION RATE: 17 BRPM | TEMPERATURE: 98.1 F | WEIGHT: 306.44 LBS | HEIGHT: 78 IN | OXYGEN SATURATION: 97 % | DIASTOLIC BLOOD PRESSURE: 64 MMHG | HEART RATE: 104 BPM | BODY MASS INDEX: 35.46 KG/M2 | SYSTOLIC BLOOD PRESSURE: 102 MMHG

## 2023-04-14 DIAGNOSIS — Z96.641 STATUS POST RIGHT HIP REPLACEMENT: Primary | ICD-10-CM

## 2023-04-14 LAB
ANION GAP SERPL CALCULATED.3IONS-SCNC: 12.6 MMOL/L (ref 5–15)
BUN SERPL-MCNC: 13 MG/DL (ref 8–23)
BUN/CREAT SERPL: 15.5 (ref 7–25)
CALCIUM SPEC-SCNC: 8.8 MG/DL (ref 8.6–10.5)
CHLORIDE SERPL-SCNC: 103 MMOL/L (ref 98–107)
CO2 SERPL-SCNC: 25.4 MMOL/L (ref 22–29)
CREAT SERPL-MCNC: 0.84 MG/DL (ref 0.76–1.27)
DEPRECATED RDW RBC AUTO: 37.1 FL (ref 37–54)
EGFRCR SERPLBLD CKD-EPI 2021: 98 ML/MIN/1.73
ERYTHROCYTE [DISTWIDTH] IN BLOOD BY AUTOMATED COUNT: 12.9 % (ref 12.3–15.4)
GLUCOSE BLDC GLUCOMTR-MCNC: 304 MG/DL (ref 70–130)
GLUCOSE SERPL-MCNC: 250 MG/DL (ref 65–99)
HCT VFR BLD AUTO: 31.9 % (ref 37.5–51)
HGB BLD-MCNC: 9.8 G/DL (ref 13–17.7)
MCH RBC QN AUTO: 24.6 PG (ref 26.6–33)
MCHC RBC AUTO-ENTMCNC: 30.7 G/DL (ref 31.5–35.7)
MCV RBC AUTO: 79.9 FL (ref 79–97)
PLATELET # BLD AUTO: 273 10*3/MM3 (ref 140–450)
PMV BLD AUTO: 9.4 FL (ref 6–12)
POTASSIUM SERPL-SCNC: 4 MMOL/L (ref 3.5–5.2)
RBC # BLD AUTO: 3.99 10*6/MM3 (ref 4.14–5.8)
SODIUM SERPL-SCNC: 141 MMOL/L (ref 136–145)
WBC NRBC COR # BLD: 7.53 10*3/MM3 (ref 3.4–10.8)

## 2023-04-14 PROCEDURE — 63710000001 INSULIN LISPRO (HUMAN) PER 5 UNITS: Performed by: PHYSICIAN ASSISTANT

## 2023-04-14 PROCEDURE — 97110 THERAPEUTIC EXERCISES: CPT

## 2023-04-14 PROCEDURE — 82962 GLUCOSE BLOOD TEST: CPT

## 2023-04-14 PROCEDURE — G0378 HOSPITAL OBSERVATION PER HR: HCPCS

## 2023-04-14 PROCEDURE — 85027 COMPLETE CBC AUTOMATED: CPT | Performed by: ORTHOPAEDIC SURGERY

## 2023-04-14 PROCEDURE — 80048 BASIC METABOLIC PNL TOTAL CA: CPT | Performed by: ORTHOPAEDIC SURGERY

## 2023-04-14 RX ADMIN — CLINDAMYCIN PHOSPHATE 900 MG: 900 INJECTION, SOLUTION INTRAVENOUS at 00:17

## 2023-04-14 RX ADMIN — OXYCODONE AND ACETAMINOPHEN 1 TABLET: 5; 325 TABLET ORAL at 04:58

## 2023-04-14 RX ADMIN — HYDROCHLOROTHIAZIDE 25 MG: 25 TABLET ORAL at 09:04

## 2023-04-14 RX ADMIN — LISINOPRIL 30 MG: 20 TABLET ORAL at 09:04

## 2023-04-14 RX ADMIN — FERROUS SULFATE TAB 325 MG (65 MG ELEMENTAL FE) 325 MG: 325 (65 FE) TAB at 09:04

## 2023-04-14 RX ADMIN — OXYCODONE AND ACETAMINOPHEN 1 TABLET: 5; 325 TABLET ORAL at 09:10

## 2023-04-14 RX ADMIN — INSULIN LISPRO 5 UNITS: 100 INJECTION, SOLUTION INTRAVENOUS; SUBCUTANEOUS at 09:04

## 2023-04-14 RX ADMIN — ASPIRIN 325 MG: 325 TABLET, COATED ORAL at 09:04

## 2023-04-14 RX ADMIN — METFORMIN HYDROCHLORIDE 1000 MG: 1000 TABLET, FILM COATED ORAL at 09:04

## 2023-04-14 NOTE — THERAPY TREATMENT NOTE
Patient Name: Sebastian Orlando  : 1960    MRN: [id number]                              Today's Date: 2023       Admit Date: 2023    Visit Dx:     ICD-10-CM ICD-9-CM   1. Primary osteoarthritis of right hip  M16.11 715.15     Patient Active Problem List   Diagnosis   • Primary osteoarthritis of right hip     Past Medical History:   Diagnosis Date   • Anemia     TAKING IRON   • Arthritis     HIPS   • Diabetes mellitus    • GERD (gastroesophageal reflux disease)    • History of migraine     SEVERAL YEARS AGO   • Hypertension    • Right hip pain      Past Surgical History:   Procedure Laterality Date   • CATARACT EXTRACTION, BILATERAL Bilateral    • COLONOSCOPY     • EYE PTOSIS REPAIR Right 2022    Procedure: RIGHT UPPER LID PTOSIS REPAIR;  Surgeon: Cristian Alvarenga MD;  Location: [facility] OR [facility];  Service: Ophthalmology;  Laterality: Right;   • TESTICLE UNDESCENDED REPAIR     • TOTAL HIP ARTHROPLASTY Left    • TOTAL HIP ARTHROPLASTY Right 2023    Procedure: TOTAL HIP ARTHROPLASTY POSERIOR;  Surgeon: Oswaldo West MD;  Location: [facility] OR [facility];  Service: Orthopedics;  Laterality: Right;      General Information     Row Name 23          Physical Therapy Time and Intention    Document Type therapy note (daily note)  -EJ     Mode of Treatment physical therapy  -     Row Name 23          General Information    Existing Precautions/Restrictions hip, posterior;right  -EJ           User Key  (r) = Recorded By, (t) = Taken By, (c) = Cosigned By    Initials Name Provider Type    EJ Amber Huddleston, PT Physical Therapist               Mobility     Row Name 23          Bed Mobility    Comment, (Bed Mobility) up in chair  -     Row Name 23          Sit-Stand Transfer    Sit-Stand [place] (Transfers) verbal cues;standby assist  -EJ     Assistive Device (Sit-Stand Transfers) walker, front-wheeled  -EJ     Row Name 23           Gait/Stairs (Locomotion)    Angelina Level (Gait) standby assist  -EJ     Assistive Device (Gait) walker, front-wheeled  -EJ     Distance in Feet (Gait) 90  -EJ     Deviations/Abnormal Patterns (Gait) john decreased;antalgic;stride length decreased  -EJ     Bilateral Gait Deviations forward flexed posture  -EJ     Comment, (Gait/Stairs) able to MIP without difficulty using walker for support - reviewed sequence w stair navigation  -EJ           User Key  (r) = Recorded By, (t) = Taken By, (c) = Cosigned By    Initials Name Provider Type    Amber Ayala, PT Physical Therapist               Obj/Interventions     Row Name 04/14/23 1034          Motor Skills    Therapeutic Exercise --  R hip protocol x 5 reps  -EJ           User Key  (r) = Recorded By, (t) = Taken By, (c) = Cosigned By    Initials Name Provider Type    Amber Ayala, PT Physical Therapist               Goals/Plan    No documentation.                Clinical Impression     Row Name 04/14/23 1035          Pain    Posttreatment Pain Rating 4/10  -EJ     Pain Location - Side/Orientation Right  -EJ     Pain Location - hip  -EJ     Pain Intervention(s) Repositioned;Ambulation/increased activity  -EJ     Row Name 04/14/23 1035          Plan of Care Review    Plan of Care Reviewed With patient;spouse  -EJ     Progress improving  -EJ     Outcome Evaluation Pt seen for PT this am. He is doing well, sitting up in chair upon entry to room. Pt able to stand w SBA. He ambulated approx 90 ft w Rwx and SBA. No unsteadiness noted. Pt tolerating hip exercises well and understands his hip precautions. All questions answered at this time. No further concerns. He is safe to DC home from PT standpoint.  -EJ     Row Name 04/14/23 1035          Positioning and Restraints    Pre-Treatment Position sitting in chair/recliner  -EJ     Post Treatment Position chair  -EJ     In Chair notified nsg;sitting;call light within reach;encouraged to call for  assist;with family/caregiver  -           User Key  (r) = Recorded By, (t) = Taken By, (c) = Cosigned By    Initials Name Provider Type    Amber Ayala, PT Physical Therapist               Outcome Measures     Row Name 04/14/23 1036          How much help from another person do you currently need...    Turning from your back to your side while in flat bed without using bedrails? 4  -EJ     Moving from lying on back to sitting on the side of a flat bed without bedrails? 3  -EJ     Moving to and from a bed to a chair (including a wheelchair)? 3  -EJ     Standing up from a chair using your arms (e.g., wheelchair, bedside chair)? 3  -EJ     Climbing 3-5 steps with a railing? 3  -EJ     To walk in hospital room? 3  -EJ     AM-PAC 6 Clicks Score (PT) 19  -EJ     Highest level of mobility 6 --> Walked 10 steps or more  -           User Key  (r) = Recorded By, (t) = Taken By, (c) = Cosigned By    Initials Name Provider Type    Amber Ayala, PT Physical Therapist                             Physical Therapy Education     Title: PT OT SLP Therapies (In Progress)     Topic: Physical Therapy (In Progress)     Point: Mobility training (Done)     Learning Progress Summary           Patient Acceptance, E,TB,D, VU,NR by  at 4/13/2023 1530                   Point: Home exercise program (Done)     Learning Progress Summary           Patient Acceptance, E,TB,D, VU,NR by  at 4/13/2023 1530                   Point: Body mechanics (Not Started)     Learner Progress:  Not documented in this visit.          Point: Precautions (Done)     Learning Progress Summary           Patient Acceptance, E,TB,D, VU,NR by  at 4/13/2023 1530                               User Key     Initials Effective Dates Name Provider Type [facility] 06/16/21 -  Amber Huddleston, PT Physical Therapist PT              PT Recommendation and Plan  Planned Therapy Interventions (PT): balance training, bed mobility training, gait  training, home exercise program, patient/family education, stretching, strengthening, stair training, ROM (range of motion), transfer training  Plan of Care Reviewed With: patient, spouse  Progress: improving  Outcome Evaluation: Pt seen for PT this am. He is doing well, sitting up in chair upon entry to room. Pt able to stand w SBA. He ambulated approx 90 ft w Rwx and SBA. No unsteadiness noted. Pt tolerating hip exercises well and understands his hip precautions. All questions answered at this time. No further concerns. He is safe to DC home from PT standpoint.     Time Calculation:    PT Charges     Row Name 04/14/23 1037             Time Calculation    Start Time 1014  -EJ      Stop Time 1032  -EJ      Time Calculation (min) 18 min  -EJ      PT Received On 04/14/23  -EJ            User Key  (r) = Recorded By, (t) = Taken By, (c) = Cosigned By    Initials Name Provider Type    Amber Ayala, PT Physical Therapist              Therapy Charges for Today     Code Description Service Date Service Provider Modifiers Qty    68616215862  PT EVAL LOW COMPLEXITY 3 4/13/2023 Amber Huddleston, PT GP 1    99126115228 HC PT THER PROC EA 15 MIN 4/13/2023 Amber Huddleston, PT GP 1    44759340781 HC PT THER PROC EA 15 MIN 4/14/2023 Amber Huddleston, PT GP 1          PT G-Codes  Outcome Measure Options: AM-PAC 6 Clicks Basic Mobility (PT)  AM-PAC 6 Clicks Score (PT): 19  PT Discharge Summary  Anticipated Discharge Disposition (PT): home with assist, home with home health    Amber Huddleston PT  4/14/2023

## 2023-04-14 NOTE — PLAN OF CARE
Goal Outcome Evaluation:   PT is Post -op for right total Hip Sx. Alert and O x 4. Denies any discomfort upon arrival. VSS. Abductor pillow in place. Walked from stretcher to bed with walker x 2 assist. Blood glucose monitoring. WCTM.

## 2023-04-14 NOTE — PLAN OF CARE
Goal Outcome Evaluation:  Plan of Care Reviewed With: patient        Progress: improving  Outcome Evaluation: VSS POD #1 right post. hip, border dressing c/d/i. minimal pain, percocets effective, voiding well, ambulating, accu checks with s/s coverage, anticipating d/c home today.

## 2023-04-14 NOTE — PROGRESS NOTES
[facility] following for home care needs.  CI verified insurance and our agency is in network.  No careplan noted.  Remove staples/zip Dsg POD 14.  Contact surgeon for further orders or needs.  Order will be transcribed for home PT.  All info was confirmed at bedside with patient and wife.

## 2023-04-14 NOTE — PROGRESS NOTES
Orthopaedic Surgery  Progress Note  4/14/2023    Patients Name:  Sebastian Orlando  YOB: 1960  Age: 62 y.o.  Medical Records Number:  [id number]  Date of Admission: 4/13/2023    No complaints except appropriate pain and stiffness in the right hip    Vitals:  Vitals:    04/13/23 1749 04/13/23 2130 04/14/23 0131 04/14/23 0535   BP: 158/79 141/66 134/71 102/64   BP Location: Left arm Left arm Left arm Left arm   Patient Position: Lying Lying Lying Lying   Pulse: 104 108 95 104   Resp: 16 16 17 17   Temp: 99 °F (37.2 °C) 97.6 °F (36.4 °C) 97.7 °F (36.5 °C) 98.1 °F (36.7 °C)   TempSrc: Oral Oral Oral Oral   SpO2: 97% 93% 100% 97%   Weight:       Height:           RLE:  NVI, calf nontender, Sensation intact  No signs of DVT    Incision: clean, no signs of infection    Lab Results (last 24 hours)     Procedure Component Value Units Date/Time    POC Glucose Once [[id number]]  (Abnormal) Collected: 04/14/23 0615    Specimen: Blood Updated: 04/14/23 0616     Glucose 304 mg/dL      Comment: Meter: [id number] : [id number] Reyes Edessa RN       Basic Metabolic Panel [[id number]]  (Abnormal) Collected: 04/14/23 0446    Specimen: Blood Updated: 04/14/23 0541     Glucose 250 mg/dL      BUN 13 mg/dL      Creatinine 0.84 mg/dL      Sodium 141 mmol/L      Potassium 4.0 mmol/L      Chloride 103 mmol/L      CO2 25.4 mmol/L      Calcium 8.8 mg/dL      BUN/Creatinine Ratio 15.5     Anion Gap 12.6 mmol/L      eGFR 98.0 mL/min/1.73     Narrative:      GFR Normal >60  Chronic Kidney Disease <60  Kidney Failure <15      CBC (No Diff) [[id number]]  (Abnormal) Collected: 04/14/23 0446    Specimen: Blood Updated: 04/14/23 0521     WBC 7.53 10*3/mm3      RBC 3.99 10*6/mm3      Hemoglobin 9.8 g/dL      Hematocrit 31.9 %      MCV 79.9 fL      MCH 24.6 pg      MCHC 30.7 g/dL      RDW 12.9 %      RDW-SD 37.1 fl      MPV 9.4 fL      Platelets 273 10*3/mm3     POC Glucose Once [[id number]]  (Abnormal) Collected: 04/13/23 9372     Specimen: Blood Updated: 04/13/23 2149     Glucose 295 mg/dL      Comment: Meter: [id number] : [id number] Bel[address] Pascual SONJA RN       POC Glucose Once [[id number]]  (Abnormal) Collected: 04/13/23 1607    Specimen: Blood Updated: 04/13/23 1608     Glucose 323 mg/dL      Comment: Meter: [id number] : [id number] Seymour Law NA       POC Glucose Once [[id number]]  (Abnormal) Collected: 04/13/23 0936    Specimen: Blood Updated: 04/13/23 0937     Glucose 223 mg/dL      Comment: Meter: [id number] : [id number] Chely Sterling NA             XR Hip 1 View Without Pelvis Right (Surgery Only)    Result Date: 4/13/2023  Narrative: XR HIP 1 VIEW WO PELVIS RIGHT-  04/13/2023  HISTORY: Postop right hip arthroplasty.  The acetabular and proximal femoral components are well-seated with no abnormal surrounding bony lucencies. Soft tissue air is seen. No unexpected findings are noted.      Impression: 1. Satisfactory postoperative appearance of the right hip.  This report was finalized on 4/13/2023 9:57 AM by Dr. Fili Davison M.D.      Peripheral Block    Result Date: 4/13/2023  Narrative: Srinivas Campbell MD     4/13/2023  6:43 AM Peripheral Block Pre-sedation assessment completed: 4/13/2023 6:35 AM Patient reassessed immediately prior to procedure Patient location during procedure: pre-op Start time: 4/13/2023 6:36 AM Stop time: 4/13/2023 6:38 AM Reason for block: at surgeon's request and post-op pain management Performed by Anesthesiologist: Srinivas Campbell MD Preanesthetic Checklist Completed: patient identified, IV checked, site marked, risks and benefits discussed, surgical consent, monitors and equipment checked, pre-op evaluation and timeout performed Prep: Pt Position: supine Sterile barriers:cap, mask and gloves Prep: ChloraPrep Patient monitoring: blood pressure monitoring, continuous pulse oximetry and EKG Procedure Sedation: yes Performed under: local infiltration Guidance:ultrasound guided  ULTRASOUND INTERPRETATION.  Using ultrasound guidance a 21 G gauge needle was placed in close proximity to the nerve, at which point, under ultrasound guidance anesthetic was injected in the area of the nerve and spread of the anesthesia was seen on ultrasound in close proximity thereto.  There were no abnormalities seen on ultrasound; a digital image was taken; and the patient tolerated the procedure with no complications. Images:still images obtained, printed/placed on chart Laterality:right Block Type:fascia iliaca compartment Injection Technique:single-shot Needle Type:echogenic and Tuohy Needle Gauge:21 G Resistance on Injection: none Medications Used: dexamethasone (DECADRON) injection - Injection  4 mg - 4/13/2023 6:36:00 AM ropivacaine (NAROPIN) 0.2 % injection - Injection  30 mL - 4/13/2023 6:36:00 AM Post Assessment Injection Assessment: negative aspiration for heme, no paresthesia on injection and incremental injection Patient Tolerance:comfortable throughout block Complications:no Additional Notes Ultrasound guidance used to visualize nerve anatomy, guide needle placement and verify local anesthetic disbursement.     XR Hip With or Without Pelvis 2 - 3 View Right, XR Chest PA & Lateral    Result Date: 4/3/2023  Narrative: XR CHEST PA AND LATERAL-, XR HIP W OR WO PELVIS 2-3 VIEW RIGHT-  CLINICAL: Preop right hip surgery.  COMPARISON: Pelvis dated 03/19/2013, chest radiograph 03/19/2013.  CHEST FINDINGS: Collar artifact superimposes the lung apices. Heart size within normal limits. The mediastinum and marc are stable. Lungs are clear.  CONCLUSION: No active disease of the chest  AP PELVIS AND RIGHT HIP FINDINGS: Total left hip replacement prosthesis is satisfactory in appearance. The right hemipelvis is normal. There is right hip joint narrowing, this has progressed since 2013 examination. Near bone-on-bone configuration. There is periarticular bone hypertrophy and subchondral sclerosis also seen. No acute  osseous or articular abnormality seen. Surrounding soft tissues have a satisfactory appearance.  CONCLUSION: Significant right hip joint degeneration which has progressed since 2013.  This report was finalized on 4/3/2023 1:59 PM by Dr. oMe Nunez M.D.          Assesment/Plan:    Procedures:  Right MARIKA  Postoperative Day: 1  Weightbearing Status:  WBAT with walker  DVT Prophylaxis:  ASA for DVT prophylaxis    Disposition:  Home with home health after PT today, if comfortable and mobilizing safely    Oswaldo Mckinley PAPablitoC  [facility]  [address] [place] [address]  [phone]    4/14/2023

## 2023-04-14 NOTE — PROGRESS NOTES
Continued Stay Note  [facility]     Patient Name: Sebastian Orlando  MRN: [id number]  Today's Date: 4/14/2023    Admit Date: 4/13/2023    Plan: [facility]   Discharge Plan     Row Name 04/14/23 1332       Plan    Plan [facility]    Patient/Family in Agreement with Plan yes    Plan Comments [facility] unable to accept due to payor/insurance. Pt agreeable to d/c home with [facility]. Referral sent in [place]. Per Gricelda/[facility] is able to accept. Plan will be home with [facility] and family support.    Final Discharge Disposition Code 06 - home with home health care    Final Note [facility]               Discharge Codes    No documentation.               Expected Discharge Date and Time     Expected Discharge Date Expected Discharge Time    Apr 14, 2023             Anastasia Wiley RN

## 2023-04-14 NOTE — PLAN OF CARE
Goal Outcome Evaluation:  Plan of Care Reviewed With: patient, spouse        Progress: improving  Outcome Evaluation: Pt seen for PT this am. He is doing well, sitting up in chair upon entry to room. Pt able to stand w SBA. He ambulated approx 90 ft w Rwx and SBA. No unsteadiness noted. Pt tolerating hip exercises well and understands his hip precautions. All questions answered at this time. No further concerns. He is safe to DC home from PT standpoint.

## 2023-04-15 ENCOUNTER — HOME CARE VISIT (OUTPATIENT)
Dept: HOME HEALTH SERVICES | Facility: HOME HEALTHCARE | Age: 63
End: 2023-04-15
Payer: COMMERCIAL

## 2023-04-15 VITALS
SYSTOLIC BLOOD PRESSURE: 126 MMHG | TEMPERATURE: 97.6 F | DIASTOLIC BLOOD PRESSURE: 78 MMHG | OXYGEN SATURATION: 94 % | RESPIRATION RATE: 17 BRPM | HEART RATE: 105 BPM

## 2023-04-15 PROCEDURE — G0151 HHCP-SERV OF PT,EA 15 MIN: HCPCS

## 2023-04-17 ENCOUNTER — HOME CARE VISIT (OUTPATIENT)
Dept: HOME HEALTH SERVICES | Facility: HOME HEALTHCARE | Age: 63
End: 2023-04-17
Payer: COMMERCIAL

## 2023-04-17 PROCEDURE — G0151 HHCP-SERV OF PT,EA 15 MIN: HCPCS

## 2023-04-17 NOTE — HOME HEALTH
Client has difficulty with walking, transfers and ADLs secondary to decrease functional strength, balance and pain following right total hip arthroplasty. He needs skilled physical therapy to improve independence with ambulation and transfers.

## 2023-04-18 VITALS
OXYGEN SATURATION: 97 % | DIASTOLIC BLOOD PRESSURE: 62 MMHG | TEMPERATURE: 96.6 F | HEART RATE: 118 BPM | SYSTOLIC BLOOD PRESSURE: 90 MMHG

## 2023-04-18 NOTE — HOME HEALTH
"Patient states not feeling well today.  He has some lightheadedness and malaise.  He has not taken his blood sugars today and he was instructed to make sure he takes it when feeling poorly to make sure it is not too low.  His blood pressure was low upon assessment and he stated he tried to \"catch up\" on his pills last night and took all his BP meds together and he thinks that might have contributed.  His hip dressing was removed and replaced due to old drainage on the dressing.  Picture taken in EPIC with Zip line dressing in place.  Skilled PT still needed for gait and transfer training, progression of HEP and pain/edema management.    Plan for next visit  Progress standing HEP  Progress gait duration/distance  Inspect hip incision."

## 2023-04-20 ENCOUNTER — HOME CARE VISIT (OUTPATIENT)
Dept: HOME HEALTH SERVICES | Facility: HOME HEALTHCARE | Age: 63
End: 2023-04-20
Payer: COMMERCIAL

## 2023-04-20 PROCEDURE — G0151 HHCP-SERV OF PT,EA 15 MIN: HCPCS

## 2023-04-21 VITALS
TEMPERATURE: 96.5 F | SYSTOLIC BLOOD PRESSURE: 88 MMHG | DIASTOLIC BLOOD PRESSURE: 60 MMHG | OXYGEN SATURATION: 99 % | RESPIRATION RATE: 20 BRPM | HEART RATE: 116 BPM

## 2023-04-21 NOTE — HOME HEALTH
Patient continues to complain of not feeling well, still lightheaded at times.  He has not been taking his BS and again he was instructed to do so to rule out hypo or hyperglycemia. His blood pressure continues to be low, 88/60 when I arrived (seated, with his feet in a dependent position).  His blood pressure did rise with upright activity to 116/70 and stabilized in a reclined position to 114/68.  Dr. West's office called to report low BP and the office called back and wanted his PCP office to manage.  His PCP office called and message left with the medical assistant for nurse practitioner Adriana Rosado.  She was going to talk to April about his low pressure as well as possible anemia and talk to the patient about his meds and what to hold.  Patient also informed of his insurance only approving 3 visits and the need to get an outpt referral.  Dr. West's office was informed a they are going to send a referral over to [facility] at [place] [facility].      Plan for next visit:  will plan on doing a courtesy visit next week to check on his blood pressure and to help progress his HEP/gait  as tolerated

## 2023-04-24 ENCOUNTER — LAB REQUISITION (OUTPATIENT)
Dept: LAB | Facility: HOSPITAL | Age: 63
End: 2023-04-24
Payer: COMMERCIAL

## 2023-04-24 ENCOUNTER — HOME CARE VISIT (OUTPATIENT)
Dept: HOME HEALTH SERVICES | Facility: HOME HEALTHCARE | Age: 63
End: 2023-04-24
Payer: COMMERCIAL

## 2023-04-24 VITALS — DIASTOLIC BLOOD PRESSURE: 82 MMHG | SYSTOLIC BLOOD PRESSURE: 120 MMHG

## 2023-04-24 DIAGNOSIS — Z00.00 ENCOUNTER FOR GENERAL ADULT MEDICAL EXAMINATION WITHOUT ABNORMAL FINDINGS: ICD-10-CM

## 2023-04-24 LAB
ANION GAP SERPL CALCULATED.3IONS-SCNC: 14 MMOL/L (ref 5–15)
BASOPHILS # BLD AUTO: 0.03 10*3/MM3 (ref 0–0.2)
BASOPHILS NFR BLD AUTO: 0.6 % (ref 0–1.5)
BUN SERPL-MCNC: 16 MG/DL (ref 8–23)
BUN/CREAT SERPL: 15.5 (ref 7–25)
CALCIUM SPEC-SCNC: 9.6 MG/DL (ref 8.6–10.5)
CHLORIDE SERPL-SCNC: 101 MMOL/L (ref 98–107)
CO2 SERPL-SCNC: 25 MMOL/L (ref 22–29)
CREAT SERPL-MCNC: 1.03 MG/DL (ref 0.76–1.27)
DEPRECATED RDW RBC AUTO: 34.7 FL (ref 37–54)
EGFRCR SERPLBLD CKD-EPI 2021: 81.6 ML/MIN/1.73
EOSINOPHIL # BLD AUTO: 0.12 10*3/MM3 (ref 0–0.4)
EOSINOPHIL NFR BLD AUTO: 2.2 % (ref 0.3–6.2)
ERYTHROCYTE [DISTWIDTH] IN BLOOD BY AUTOMATED COUNT: 12.3 % (ref 12.3–15.4)
FERRITIN SERPL-MCNC: 574 NG/ML (ref 30–400)
GLUCOSE SERPL-MCNC: 220 MG/DL (ref 65–99)
HCT VFR BLD AUTO: 33.4 % (ref 37.5–51)
HGB BLD-MCNC: 10.6 G/DL (ref 13–17.7)
IMM GRANULOCYTES # BLD AUTO: 0.02 10*3/MM3 (ref 0–0.05)
IMM GRANULOCYTES NFR BLD AUTO: 0.4 % (ref 0–0.5)
IRON 24H UR-MRATE: 62 MCG/DL (ref 59–158)
IRON SATN MFR SERPL: 18 % (ref 20–50)
LYMPHOCYTES # BLD AUTO: 1.74 10*3/MM3 (ref 0.7–3.1)
LYMPHOCYTES NFR BLD AUTO: 32 % (ref 19.6–45.3)
MCH RBC QN AUTO: 24.5 PG (ref 26.6–33)
MCHC RBC AUTO-ENTMCNC: 31.7 G/DL (ref 31.5–35.7)
MCV RBC AUTO: 77.3 FL (ref 79–97)
MONOCYTES # BLD AUTO: 0.31 10*3/MM3 (ref 0.1–0.9)
MONOCYTES NFR BLD AUTO: 5.7 % (ref 5–12)
NEUTROPHILS NFR BLD AUTO: 3.21 10*3/MM3 (ref 1.7–7)
NEUTROPHILS NFR BLD AUTO: 59.1 % (ref 42.7–76)
NRBC BLD AUTO-RTO: 0 /100 WBC (ref 0–0.2)
PLATELET # BLD AUTO: 526 10*3/MM3 (ref 140–450)
PMV BLD AUTO: 9.2 FL (ref 6–12)
POTASSIUM SERPL-SCNC: 4 MMOL/L (ref 3.5–5.2)
RBC # BLD AUTO: 4.32 10*6/MM3 (ref 4.14–5.8)
SODIUM SERPL-SCNC: 140 MMOL/L (ref 136–145)
TIBC SERPL-MCNC: 340 MCG/DL (ref 298–536)
TRANSFERRIN SERPL-MCNC: 228 MG/DL (ref 200–360)
WBC NRBC COR # BLD: 5.43 10*3/MM3 (ref 3.4–10.8)

## 2023-04-24 PROCEDURE — 83540 ASSAY OF IRON: CPT | Performed by: NURSE PRACTITIONER

## 2023-04-24 PROCEDURE — 85025 COMPLETE CBC W/AUTO DIFF WBC: CPT | Performed by: NURSE PRACTITIONER

## 2023-04-24 PROCEDURE — G0151 HHCP-SERV OF PT,EA 15 MIN: HCPCS

## 2023-04-24 PROCEDURE — 80048 BASIC METABOLIC PNL TOTAL CA: CPT | Performed by: NURSE PRACTITIONER

## 2023-04-24 PROCEDURE — 84466 ASSAY OF TRANSFERRIN: CPT | Performed by: NURSE PRACTITIONER

## 2023-04-24 PROCEDURE — G0299 HHS/HOSPICE OF RN EA 15 MIN: HCPCS

## 2023-04-24 PROCEDURE — 82728 ASSAY OF FERRITIN: CPT | Performed by: NURSE PRACTITIONER

## 2023-04-25 NOTE — HOME HEALTH
Ordered labs obtained from [facility].  VSS.  Patient is keeping BP log, and all reviewed are WNL.  Patient denies dizziness or lightheadedness. Labs to [facility] with results to PCP.  No further SN visits needed.  Patient in agreement, and tolerated procedure well.

## 2023-04-26 VITALS
OXYGEN SATURATION: 99 % | HEART RATE: 80 BPM | DIASTOLIC BLOOD PRESSURE: 84 MMHG | TEMPERATURE: 97.8 F | SYSTOLIC BLOOD PRESSURE: 110 MMHG

## 2023-04-26 NOTE — HOME HEALTH
He is feeling better, but still a bit of malaise.  His blood pressures have been up in his normal range.  His BS has been elevated per his checks the last several days. The nurse is coming by later to draw his blood.  His incision was inspected and looks intact with Zip-line present still.  It is due to be removed Thursday.  Patient advised to call for outpt PT appointment.  Insurance verification error was corrected and patient will be able to be seen by HH later in the week. Skilled PT still needed for gait and transfer training, progression of HEP and pain/edema management.    Plan for next visit  Finalize HEP  Remove Zip line dressing  Do dc assessment

## 2023-04-27 ENCOUNTER — HOME CARE VISIT (OUTPATIENT)
Dept: HOME HEALTH SERVICES | Facility: HOME HEALTHCARE | Age: 63
End: 2023-04-27
Payer: COMMERCIAL

## 2023-04-27 PROCEDURE — G0151 HHCP-SERV OF PT,EA 15 MIN: HCPCS

## 2023-04-28 ENCOUNTER — HOME CARE VISIT (OUTPATIENT)
Dept: HOME HEALTH SERVICES | Facility: HOME HEALTHCARE | Age: 63
End: 2023-04-28
Payer: COMMERCIAL

## 2023-04-28 VITALS
OXYGEN SATURATION: 93 % | DIASTOLIC BLOOD PRESSURE: 70 MMHG | RESPIRATION RATE: 18 BRPM | HEART RATE: 93 BPM | TEMPERATURE: 97.9 F | SYSTOLIC BLOOD PRESSURE: 118 MMHG

## 2023-04-28 NOTE — HOME HEALTH
Today was to be the discharge visit, but when I arrived the patient and wife stated his incision has been draining a lot and that they almost went to the ER to get assessed.  I inspected the incision and indeed the patient had a lot of bloody drainage on the makeshift bandage he put on. I took the Zip-line dressing off per 2 week protocol and the incision did have a couple of small dehischence areas (see photo). I feel he had some scabbing that the Zip line was starting to rub against and caused the bleeding. His kirt-wound area was not warm and no redness.  The drainage was just bloody and no purulence. No other s/s of infection noted, although he continues to complain of a general malaise (his BP has been ok, but his blood sugars have been running high).  A clean island dressing was applied and patient advised it was not waterproof.  Dr. West's office called and message left about the incision and the decision to extend  PT services for one more visit early next week to assess and change the dressing as needed.  He has follow-up  with  Dr. Dixon on 5/8/23 and he needs to call out PT for an appointment as well.     Plan for next visit  Probable dc with  dc assessment  Inspect and change dressing.

## 2023-05-01 ENCOUNTER — HOME CARE VISIT (OUTPATIENT)
Dept: HOME HEALTH SERVICES | Facility: HOME HEALTHCARE | Age: 63
End: 2023-05-01
Payer: COMMERCIAL

## 2023-05-01 PROCEDURE — G0151 HHCP-SERV OF PT,EA 15 MIN: HCPCS

## 2023-05-01 NOTE — Clinical Note
[facility] as of 5/2/23.  The Betadine painting of his incision has helped and his incision looks good, clean and closed.  Thank you for this referral.

## 2023-05-02 VITALS
SYSTOLIC BLOOD PRESSURE: 126 MMHG | OXYGEN SATURATION: 98 % | DIASTOLIC BLOOD PRESSURE: 88 MMHG | RESPIRATION RATE: 18 BRPM | HEART RATE: 115 BPM | TEMPERATURE: 97.3 F

## 2023-05-02 NOTE — HOME HEALTH
Patient up on his own, still using the walker.  He states he has been compliant with the betadine painting of his incision, twice a day.  His incision was inspected and it looks good and seems to be closed now.  He will continue to use the Betadine until he returns to the MD on 5/8. His blood pressures seem to have stabilized, but his blood sugars remain high (averaging over 200 everyday).  He is to talk to his PCP about possible changes of his meds if it stays high.   He is ready now for dc.

## (undated) DEVICE — STERILE COTTON TIP 6IN 10PK: Brand: MEDLINE

## (undated) DEVICE — HANDPIECE SET WITH COAXIAL HIGH FLOW TIP AND SUCTION TUBE: Brand: INTERPULSE

## (undated) DEVICE — TRAP FLD MINIVAC MEGADYNE 100ML

## (undated) DEVICE — TBG PENCL TELESCP MEGADYNE SMOKE EVAC 10FT

## (undated) DEVICE — WIPE INST MEROCEL

## (undated) DEVICE — SWABSTK SKINPREP PVPI LF PK/3

## (undated) DEVICE — PENCL ES MEGADINE EZ/CLEAN BUTN W/HOLSTR 10FT

## (undated) DEVICE — NEEDLE, QUINCKE, 20GX3.5": Brand: MEDLINE

## (undated) DEVICE — BIT DRL RNGLC QC 3.2X20MM

## (undated) DEVICE — HEWSON SUTURE RETRIEVER: Brand: HEWSON SUTURE RETRIEVER

## (undated) DEVICE — PK ENT 40

## (undated) DEVICE — APPL CHLORAPREP HI/LITE 26ML ORNG

## (undated) DEVICE — SYR LUERLOK 30CC

## (undated) DEVICE — 3M™ IOBAN™ 2 ANTIMICROBIAL INCISE DRAPE 6648EZ: Brand: IOBAN™ 2

## (undated) DEVICE — SUT TEVDEX 5 K61 30IN 79745

## (undated) DEVICE — NDL HYPO PRECISIONGLIDE REG 25G 1 1/2

## (undated) DEVICE — RECIPROCATING BLADE HEAVY DUTY LONG, OFFSET  (77.6 X 0.77 X 11.2MM)

## (undated) DEVICE — DRSNG WND GZ PAD BORDERED 4X8IN STRL

## (undated) DEVICE — ANTIBACTERIAL UNDYED BRAIDED (POLYGLACTIN 910), SYNTHETIC ABSORBABLE SUTURE: Brand: COATED VICRYL

## (undated) DEVICE — GOWN,NON-REINFORCED,SIRUS,SET IN SLV,XL: Brand: MEDLINE

## (undated) DEVICE — PATIENT RETURN ELECTRODE, SINGLE-USE, CONTACT QUALITY MONITORING, ADULT, WITH 9FT CORD, FOR PATIENTS WEIGING OVER 33LBS. (15KG): Brand: MEGADYNE

## (undated) DEVICE — GLV SURG BIOGEL LTX PF 7 1/2

## (undated) DEVICE — SUT GUT PLN FAST ABS 5/0 PC1 18IN 1915G

## (undated) DEVICE — ELECTRD NDL EZ CLN MOD 2.75IN

## (undated) DEVICE — DECANTER BAG 9": Brand: MEDLINE INDUSTRIES, INC.

## (undated) DEVICE — DRAPE,REIN 53X77,STERILE: Brand: MEDLINE

## (undated) DEVICE — DRSNG GZ PETROLTM XEROFORM CURAD 1X8IN STRL

## (undated) DEVICE — PK HIP TOTL 40

## (undated) DEVICE — SYR LL TP 10ML STRL

## (undated) DEVICE — SUT SILK B OPTH G1 6/0 18IN 780G BX/12

## (undated) DEVICE — GLV SURG PREMIERPRO ORTHO LTX PF SZ7.5 BRN

## (undated) DEVICE — HDRST POSITIONING FM RND 2X9IN

## (undated) DEVICE — SUT MNCRYL 3/0 PS2 18IN MCP497G

## (undated) DEVICE — GLV SURG BIOGEL SENSR LTX PF SZ7.5